# Patient Record
Sex: MALE | Race: WHITE | NOT HISPANIC OR LATINO | Employment: OTHER | URBAN - METROPOLITAN AREA
[De-identification: names, ages, dates, MRNs, and addresses within clinical notes are randomized per-mention and may not be internally consistent; named-entity substitution may affect disease eponyms.]

---

## 2017-05-02 ENCOUNTER — GENERIC CONVERSION - ENCOUNTER (OUTPATIENT)
Dept: OTHER | Facility: OTHER | Age: 72
End: 2017-05-02

## 2017-05-03 LAB — GLUCOSE, PLASMA (HISTORICAL): 155 MG/DL (ref 65–99)

## 2017-05-12 ENCOUNTER — ALLSCRIPTS OFFICE VISIT (OUTPATIENT)
Dept: OTHER | Facility: OTHER | Age: 72
End: 2017-05-12

## 2017-08-01 ENCOUNTER — GENERIC CONVERSION - ENCOUNTER (OUTPATIENT)
Dept: OTHER | Facility: OTHER | Age: 72
End: 2017-08-01

## 2017-08-01 LAB — HBA1C MFR BLD HPLC: 7.1 % (ref 4.8–5.6)

## 2017-08-02 LAB — GLUCOSE, PLASMA (HISTORICAL): 135 MG/DL (ref 65–99)

## 2017-08-11 ENCOUNTER — ALLSCRIPTS OFFICE VISIT (OUTPATIENT)
Dept: OTHER | Facility: OTHER | Age: 72
End: 2017-08-11

## 2017-08-11 DIAGNOSIS — Z12.5 ENCOUNTER FOR SCREENING FOR MALIGNANT NEOPLASM OF PROSTATE: ICD-10-CM

## 2017-08-11 DIAGNOSIS — Z12.11 ENCOUNTER FOR SCREENING FOR MALIGNANT NEOPLASM OF COLON: ICD-10-CM

## 2017-09-01 ENCOUNTER — GENERIC CONVERSION - ENCOUNTER (OUTPATIENT)
Dept: OTHER | Facility: OTHER | Age: 72
End: 2017-09-01

## 2017-11-17 ENCOUNTER — GENERIC CONVERSION - ENCOUNTER (OUTPATIENT)
Dept: OTHER | Facility: OTHER | Age: 72
End: 2017-11-17

## 2017-11-21 LAB
25(OH)D3 SERPL-MCNC: 35.1 NG/ML (ref 30–100)
A/G RATIO (HISTORICAL): 1.7 (ref 1.2–2.2)
ALBUMIN SERPL BCP-MCNC: 4 G/DL (ref 3.5–4.8)
ALP SERPL-CCNC: 70 IU/L (ref 39–117)
ALT SERPL W P-5'-P-CCNC: 24 IU/L (ref 0–44)
AST SERPL W P-5'-P-CCNC: 20 IU/L (ref 0–40)
BASOPHILS # BLD AUTO: 0 %
BASOPHILS # BLD AUTO: 0 X10E3/UL (ref 0–0.2)
BILIRUB SERPL-MCNC: 0.5 MG/DL (ref 0–1.2)
BUN SERPL-MCNC: 25 MG/DL (ref 8–27)
BUN/CREA RATIO (HISTORICAL): 18 (ref 10–24)
CALCIUM SERPL-MCNC: 9.2 MG/DL (ref 8.6–10.2)
CHLORIDE SERPL-SCNC: 101 MMOL/L (ref 96–106)
CHOLEST SERPL-MCNC: 108 MG/DL (ref 100–199)
CHOLEST/HDLC SERPL: 3.2 RATIO UNITS (ref 0–5)
CO2 SERPL-SCNC: 24 MMOL/L (ref 18–29)
CREAT SERPL-MCNC: 1.4 MG/DL (ref 0.76–1.27)
DEPRECATED RDW RBC AUTO: 13.7 % (ref 12.3–15.4)
EGFR AFRICAN AMERICAN (HISTORICAL): 57 ML/MIN/1.73
EGFR-AMERICAN CALC (HISTORICAL): 49 ML/MIN/1.73
EOSINOPHIL # BLD AUTO: 0.2 X10E3/UL (ref 0–0.4)
EOSINOPHIL # BLD AUTO: 2 %
GLUCOSE SERPL-MCNC: 159 MG/DL (ref 65–99)
HBA1C MFR BLD HPLC: 7 % (ref 4.8–5.6)
HCT VFR BLD AUTO: 39.1 % (ref 37.5–51)
HDLC SERPL-MCNC: 34 MG/DL
HGB BLD-MCNC: 13.1 G/DL (ref 12.6–17.7)
IMM.GRANULOCYTES (CD4/8) (HISTORICAL): 0 %
IMM.GRANULOCYTES (CD4/8) (HISTORICAL): 0 X10E3/UL (ref 0–0.1)
LDLC SERPL CALC-MCNC: 52 MG/DL (ref 0–99)
LYMPHOCYTES # BLD AUTO: 1.8 X10E3/UL (ref 0.7–3.1)
LYMPHOCYTES # BLD AUTO: 19 %
MCH RBC QN AUTO: 29.2 PG (ref 26.6–33)
MCHC RBC AUTO-ENTMCNC: 33.5 G/DL (ref 31.5–35.7)
MCV RBC AUTO: 87 FL (ref 79–97)
MONOCYTES # BLD AUTO: 0.8 X10E3/UL (ref 0.1–0.9)
MONOCYTES (HISTORICAL): 8 %
NEUTROPHILS # BLD AUTO: 6.6 X10E3/UL (ref 1.4–7)
NEUTROPHILS # BLD AUTO: 71 %
PLATELET # BLD AUTO: 232 X10E3/UL (ref 150–379)
POTASSIUM SERPL-SCNC: 4.9 MMOL/L (ref 3.5–5.2)
PROSTATE SPECIFIC ANTIGEN (HISTORICAL): 1.5 NG/ML (ref 0–4)
RBC (HISTORICAL): 4.48 X10E6/UL (ref 4.14–5.8)
SODIUM SERPL-SCNC: 140 MMOL/L (ref 134–144)
TOT. GLOBULIN, SERUM (HISTORICAL): 2.3 G/DL (ref 1.5–4.5)
TOTAL PROTEIN (HISTORICAL): 6.3 G/DL (ref 6–8.5)
TRIGL SERPL-MCNC: 111 MG/DL (ref 0–149)
VLDLC SERPL CALC-MCNC: 22 MG/DL (ref 5–40)
WBC # BLD AUTO: 9.5 X10E3/UL (ref 3.4–10.8)

## 2017-11-22 LAB
CREATININE, URINE (HISTORICAL): 141.3 MG/DL
MICROALBUM.,U,RANDOM (HISTORICAL): 16.7 UG/ML
MICROALBUMIN/CREATININE RATIO (HISTORICAL): 11.8 MG/G CREAT (ref 0–30)

## 2017-11-26 ENCOUNTER — GENERIC CONVERSION - ENCOUNTER (OUTPATIENT)
Dept: OTHER | Facility: OTHER | Age: 72
End: 2017-11-26

## 2017-11-29 ENCOUNTER — GENERIC CONVERSION - ENCOUNTER (OUTPATIENT)
Dept: OTHER | Facility: OTHER | Age: 72
End: 2017-11-29

## 2017-12-04 LAB — PLEASE NOTE (HISTORICAL): NORMAL

## 2017-12-05 ENCOUNTER — GENERIC CONVERSION - ENCOUNTER (OUTPATIENT)
Dept: INTERNAL MEDICINE CLINIC | Facility: CLINIC | Age: 72
End: 2017-12-05

## 2017-12-05 LAB — FECAL OCCULT BLOOD DIAGNOSTIC (HISTORICAL): NEGATIVE

## 2017-12-14 ENCOUNTER — GENERIC CONVERSION - ENCOUNTER (OUTPATIENT)
Dept: INTERNAL MEDICINE CLINIC | Facility: CLINIC | Age: 72
End: 2017-12-14

## 2017-12-20 ENCOUNTER — ALLSCRIPTS OFFICE VISIT (OUTPATIENT)
Dept: OTHER | Facility: OTHER | Age: 72
End: 2017-12-20

## 2017-12-21 ENCOUNTER — GENERIC CONVERSION - ENCOUNTER (OUTPATIENT)
Dept: INTERNAL MEDICINE CLINIC | Facility: CLINIC | Age: 72
End: 2017-12-21

## 2017-12-27 ENCOUNTER — GENERIC CONVERSION - ENCOUNTER (OUTPATIENT)
Dept: INTERNAL MEDICINE CLINIC | Facility: CLINIC | Age: 72
End: 2017-12-27

## 2018-01-13 VITALS
HEART RATE: 48 BPM | HEIGHT: 72 IN | OXYGEN SATURATION: 98 % | TEMPERATURE: 95.8 F | BODY MASS INDEX: 31.15 KG/M2 | SYSTOLIC BLOOD PRESSURE: 122 MMHG | DIASTOLIC BLOOD PRESSURE: 70 MMHG | WEIGHT: 230 LBS

## 2018-01-13 NOTE — PROGRESS NOTES
Assessment    1  Arteriosclerotic cardiovascular disease (429 2,440 9) (I25 10)   2  DMII (diabetes mellitus, type 2) (250 00) (E11 9)   3  Hyperlipidemia (272 4) (E78 5)    Plan  Diabetes    · Pioglitazone HCl - 15 MG Oral Tablet (Actos); TAKE 1 TABLET BY MOUTH EVERY  DAY  DMII (diabetes mellitus, type 2)    · Pioglitazone HCl - 15 MG Oral Tablet (Actos); TAKE 1 TABLET BY MOUTH EVERY  DAY   · (1) GLUCOSE,  FASTING; Status:Active; Requested for:63Ksp4589;    · (1) HEMOGLOBIN A1C; Status:Active; Requested for:64Wbh8806;    · *VB - Eye Exam; Status:Active; Requested for:01Dec2016;   DMII (diabetes mellitus, type 2), Vitamin D deficiency    · Onglyza 5 MG Oral Tablet  Hyperlipidemia    · Follow-up visit in 4 Months Evaluation and Treatment  Follow-up  Status: Hold For -  Scheduling  Requested for: 02Dec2016  Need for pneumococcal vaccine    · Prevnar 13 Intramuscular Suspension  Need for prophylactic vaccination and inoculation against influenza    · Fluzone High-Dose 0 5 ML Intramuscular Suspension Prefilled Syringe  Unlinked    · Nitrostat 0 4 MG Sublingual Tablet Sublingual (Nitroglycerin)    Discussion/Summary    #1  Atherosclerotic cardiovascular disease  Patient's cholesterol is under control with present treatment and patient continues to follow-up with his cardiologist on a regular basis  Patient understands importance of her early intervention if symptomatic  Patient is followed very closely by the his wife  #2  Diabetes mellitus type 2  His hemoglobin A1c is 7 0 and patient was told we would like to see better control  Patient was given a new prescription for Actos at 15 mg by mouth daily  He does understand possible side effects of the medication deluding increased swelling of the feet and ankles and he was told if this becomes a problem to please call and stop the medication  #3  Hyperlipidemia  Patient's cholesterol is showing relatively good control and we will continue to monitor this   Patient was told to avoid fats and cholesterol in his diet  Impression: Subsequent Annual Wellness Visit, with preventive exam as well as age and risk appropriate counseling completed  Cardiovascular screening and counseling: the risks and benefits of screening were discussed and screening is current  Diabetes screening and counseling: the risks and benefits of screening were discussed and screening is current  Colorectal cancer screening and counseling: the risks and benefits of screening were discussed and screening is current  Prostate cancer screening and counseling: the risks and benefits of screening were discussed and screening is current  Osteoporosis screening and counseling: the risks and benefits of screening were discussed and the patient declines screening  Abdominal aortic aneurysm screening and counseling: the risks and benefits of screening were discussed and screening is current  Glaucoma screening and counseling: the risks and benefits of screening were discussed and screening is current  HIV screening and counseling: screening not indicated  Immunizations: the risks and benefits of influenza vaccination were discussed with the patient, influenza vaccine is up to date this year, influenza vaccine is due today, the risks and benefits of pneumococcal vaccination were discussed with the patient, the lifetime pneumococcal vaccine has been completed, hepatitis B vaccination series is not indicated at this time due to the patient's low risk of mayelin the disease, the risks and benefits of the Zostavax vaccine were discussed with the patient, the patient declines the Zostavax vaccine, the risks and benefits of the Td vaccine were discussed with the patient and Td vaccination up to date  Advance Directive Planning: not complete, he was encouraged to follow-up with me to discuss his questions and/or decisions   Patient Discussion: plan discussed with the patient, plan discussed with the patient's family, follow-up visit needed in 4 months  Chief Complaint  Patient is here today for an annual wellness visit w/ labs      Advance Directives  Advance Directive [facility]:   NO - Patient does not have an advance health care directive  Capacity/Competence: This patient has full decision making capacity for discussion of advance care planning and This patient has full decision making competency for discussion of advance care planning  Summary of Advance Directive Conversation  We discussed obtaining a living will with both the patient and his wife  Patient will download information and he was told to call if he has any questions or concerns  Patient is unsure of his wishes at this point  History of Present Illness  HPI: Patient is a 29-year-old male with a history of diabetes mellitus type 2, hyperlipidemia, coronary artery disease  Patient is here today for routine physical  Patient did have complete lab test prior to being seen today and we did discuss the results  The major concern is control of his blood sugar which is still marginal with a hemoglobin A1c of 7 0  We discussed changing medication with him especially with the cost and we will be starting him on Actos 15 mg by mouth daily to see if this will help to keep his sugars under better control  Welcome to [employer] and Wellness Visits: The patient is being seen for the subsequent annual wellness visit  Co-Managers and Medical Equipment/Suppliers: See Patient Care Team   Reviewed Updated [address] [place] [address] [address]:   Last Medicare Wellness Visit Information was reviewed, patient interviewed, no change since last AWV  Preventive Quality Program 65 and Older: Falls Risk: The patient fell times in the past 12 months  The patient is currently asymptomatic Symptoms Include:  Associated symptoms:  No associated symptoms are reported  The patient currently has no urinary incontinence symptoms     Date of last glaucoma screen was Patient sees the ophthalmologist on a yearly basis for diabetic retinal examination and glaucoma screening      Review of Systems    Constitutional: negative  Head and Face: negative  Eyes: negative  ENT: negative  Cardiovascular: negative  Respiratory: negative  Gastrointestinal: negative  Genitourinary: negative  Musculoskeletal: negative  Integumentary and Breasts: negative  Neurological: negative  Psychiatric: negative  Endocrine: negative  Hematologic and Lymphatic: negative  Active Problems    1  Allergic rhinitis (477 9) (J30 9)   2  Arteriosclerotic cardiovascular disease (429 2,440 9) (I25 10)   3  Back pain, thoracic (724 1) (M54 6)   4  Coronary artery disease (414 00) (I25 10)   5  Dementia (294 20) (F03 90)   6  Diabetes (250 00) (E11 9)   7  DMII (diabetes mellitus, type 2) (250 00) (E11 9)   8  Encounter for preventive health examination (V70 0) (Z00 00)   9  Hyperlipidemia (272 4) (E78 5)   10  Hypertension (401 9) (I10)   11  Localized, primary osteoarthritis of lower leg, unspecified laterality   12  Lumbago With Sciatica (724 3)   13  MCI (mild cognitive impairment) (331 83) (G31 84)   14  Memory loss or impairment (780 93) (R41 3)   15  Need for prophylactic vaccination and inoculation against influenza (V04 81) (Z23)   16  Obesity (278 00) (E66 9)   17  Vitamin D deficiency (268 9) (E55 9)    Past Medical History    · Need for prophylactic vaccination and inoculation against influenza (V04 81) (Z23)    The active problems and past medical history were reviewed and updated today  Surgical History    The surgical history was reviewed and updated today  Family History  Father    · No pertinent family history    The family history was reviewed and updated today  Social History    · Former smoker (G00 95) (U68 351)   · No alcohol use  The social history was reviewed and updated today  The social history was reviewed and is unchanged  Current Meds   1   Aspirin 81 MG CAPS;   Therapy: (Recorded:61Gvh3226) to Recorded   2  Atorvastatin Calcium 40 MG Oral Tablet; Take 1 tablet daily; Therapy: 20UGR2348 to (Evaluate:58Ght4827)  Requested for: 41LTJ6921; Last   Rx:28Ljq4273 Ordered   3  Daily Multiple Vitamins TABS; Therapy: (Recorded:66Uhj2836) to Recorded   4  MetFORMIN HCl  MG Oral Tablet Extended Release 24 Hour; take 1 tablet twice a   day; Therapy: 11JSJ1562 to (Last Rx:56Eqc4872)  Requested for: 11WWZ9944 Ordered   5  Metoprolol Succinate ER 25 MG Oral Tablet Extended Release 24 Hour; Take 1 tablet   daily; Therapy: 91PKW4721 to (Evaluate:02Zud4516)  Requested for: 97ZSO9101; Last   Rx:22Fue1902 Ordered   6  Nitrostat 0 4 MG Sublingual Tablet Sublingual;   Therapy: 72Shw4964 to (Last Rx:93Itp1732)  Requested for: 24Teh1142 Ordered   7  Onglyza 5 MG Oral Tablet; TAKE ONE TABLET BY MOUTH EVERY DAY; Therapy: 22SHO2125 to (Evaluate:60Bvp4493); Last Rx:19Yey5114 Ordered    The medication list was reviewed and updated today  Allergies    1  No Known Drug Allergies    Immunizations   ** Printed in Appendix #1 below  Vitals  Signs    Temperature: 96 2 F  Heart Rate: 64  Systolic: 120  Diastolic: 72  Height: 6 ft   Weight: 221 lb 2 08 oz  BMI Calculated: 29 99  BSA Calculated: 2 23  O2 Saturation: 99    Physical Exam    Constitutional Pleasant overweight 47-year-old male who is awake alert in no acute distress and oriented x3  Head and Face   Head and face: Normal     Palpation of the face and sinuses: No sinus tenderness  Eyes   Conjunctiva and lids: No erythema, swelling or discharge  Pupils and irises: Equal, round, reactive to light  Ophthalmoscopic examination: Normal fundi and optic discs  Ears, Nose, Mouth, and Throat   External inspection of ears and nose: Normal     Otoscopic examination: Tympanic membranes translucent with normal light reflex  Canals patent without erythema      Hearing: Normal     Nasal mucosa, septum, and turbinates: Normal without edema or erythema  Lips, teeth, and gums: Normal, good dentition  Oropharynx: Normal with no erythema, edema, exudate or lesions  Neck   Neck: Supple, symmetric, trachea midline, no masses  Thyroid: Normal, no thyromegaly  Pulmonary   Respiratory effort: No increased work of breathing or signs of respiratory distress  Percussion of chest: Normal     Palpation of chest: Normal     Auscultation of lungs: Clear to auscultation  Cardiovascular   Palpation of heart: Normal PMI, no thrills  Auscultation of heart: Normal rate and rhythm, normal S1 and S2, no murmurs  Carotid pulses: 2+ bilaterally  Abdominal aorta: Normal     Femoral pulses: 2+ bilaterally  Pedal pulses: 2+ bilaterally  Peripheral vascular exam: Normal     Examination of extremities for edema and/or varicosities: Normal     Chest   Breasts: Normal, no dimpling or skin changes appreciated  Palpation of breasts and axillae: Normal, no masses palpated  Chest: Normal     Abdomen   Abdomen: Non-tender, no masses  Liver and spleen: No hepatomegaly or splenomegaly  Examination for hernias: No hernias appreciated  Anus, perineum, and rectum: Normal sphincter tone, no masses, no prolapse  Awaiting results of Hemoccult, on rectal examination patient did not have any stool use for testing  Genitourinary   Scrotal contents: Normal testes, no masses  Penis: Normal, no lesions  Digital rectal exam of prostate: Normal size, no masses  Lymphatic   Palpation of lymph nodes in neck: No lymphadenopathy  Palpation of lymph nodes in axillae: No lymphadenopathy  Palpation of lymph nodes in groin: No lymphadenopathy  Palpation of lymph nodes in other areas: No lymphadenopathy  Musculoskeletal   Gait and station: Normal     Inspection/palpation of digits and nails: Normal without clubbing or cyanosis      Inspection/palpation of joints, bones, and muscles: Normal     Range of motion: Normal  Stability: Normal     Muscle strength/tone: Normal     Skin   Skin and subcutaneous tissue: Normal without rashes or lesions  Palpation of skin and subcutaneous tissue: Normal turgor  Neurologic   Cranial nerves: Cranial nerves 2-12 intact  Cortical function: Normal mental status  Reflexes: 2+ and symmetric  Sensation: No sensory loss  Coordination: Normal finger to nose and heel to shin  Psychiatric   Judgment and insight: Normal     Orientation to person, place and time: Normal     Recent and remote memory: Intact      Mood and affect: Normal        Future Appointments    Date/Time Provider Specialty Site   2017 01:00 PM Ciro Schmitz DO Internal Medicine [facility] INTERNAL MED     Signatures   Electronically signed by : Jenny Echols DO; Dec  2 2016  5:14PM EST                       (Author)    Appendix #1     Patient: Samantha Anderson ; : 1945; MRN: [id number]      1 2 3 4 5    Influenza  20-Sep-2012 03-Sep-2013 03-Oct-2014 16-Nov-2015 Unknown

## 2018-01-14 VITALS
HEIGHT: 72 IN | BODY MASS INDEX: 30.78 KG/M2 | SYSTOLIC BLOOD PRESSURE: 140 MMHG | OXYGEN SATURATION: 98 % | HEART RATE: 58 BPM | WEIGHT: 227.25 LBS | DIASTOLIC BLOOD PRESSURE: 74 MMHG | TEMPERATURE: 96.3 F

## 2018-01-23 VITALS
OXYGEN SATURATION: 99 % | SYSTOLIC BLOOD PRESSURE: 130 MMHG | DIASTOLIC BLOOD PRESSURE: 62 MMHG | TEMPERATURE: 96.3 F | HEIGHT: 72 IN | WEIGHT: 226 LBS | BODY MASS INDEX: 30.61 KG/M2 | HEART RATE: 52 BPM

## 2018-03-15 DIAGNOSIS — E11.69 TYPE 2 DIABETES MELLITUS WITH OTHER SPECIFIED COMPLICATION, WITHOUT LONG-TERM CURRENT USE OF INSULIN (HCC): Primary | ICD-10-CM

## 2018-03-16 RX ORDER — METFORMIN HYDROCHLORIDE 750 MG/1
TABLET, EXTENDED RELEASE ORAL
Qty: 180 TABLET | Refills: 0 | Status: SHIPPED | OUTPATIENT
Start: 2018-03-16 | End: 2018-07-12 | Stop reason: SDUPTHER

## 2018-03-16 RX ORDER — ATORVASTATIN CALCIUM 40 MG/1
TABLET, FILM COATED ORAL
Qty: 90 TABLET | Refills: 0 | Status: SHIPPED | OUTPATIENT
Start: 2018-03-16 | End: 2018-07-12 | Stop reason: SDUPTHER

## 2018-05-08 LAB
BUN SERPL-MCNC: 21 MG/DL (ref 8–27)
BUN/CREAT SERPL: 18 (ref 10–24)
CALCIUM SERPL-MCNC: 9 MG/DL (ref 8.6–10.2)
CHLORIDE SERPL-SCNC: 103 MMOL/L (ref 96–106)
CO2 SERPL-SCNC: 22 MMOL/L (ref 18–29)
CREAT SERPL-MCNC: 1.18 MG/DL (ref 0.76–1.27)
GLUCOSE SERPL-MCNC: 198 MG/DL (ref 65–99)
HBA1C MFR BLD: 7.1 % (ref 4.8–5.6)
POTASSIUM SERPL-SCNC: 5.1 MMOL/L (ref 3.5–5.2)
SL AMB EGFR AFRICAN AMERICAN: 70 ML/MIN/1.73
SL AMB EGFR NON AFRICAN AMERICAN: 60 ML/MIN/1.73
SODIUM SERPL-SCNC: 138 MMOL/L (ref 134–144)

## 2018-05-18 ENCOUNTER — OFFICE VISIT (OUTPATIENT)
Dept: INTERNAL MEDICINE CLINIC | Facility: CLINIC | Age: 73
End: 2018-05-18
Payer: COMMERCIAL

## 2018-05-18 VITALS
TEMPERATURE: 96.3 F | WEIGHT: 219 LBS | DIASTOLIC BLOOD PRESSURE: 70 MMHG | BODY MASS INDEX: 29.66 KG/M2 | SYSTOLIC BLOOD PRESSURE: 130 MMHG | HEART RATE: 54 BPM | OXYGEN SATURATION: 98 % | HEIGHT: 72 IN

## 2018-05-18 DIAGNOSIS — I25.10 ARTERIOSCLEROTIC CARDIOVASCULAR DISEASE: ICD-10-CM

## 2018-05-18 DIAGNOSIS — E11.9 TYPE 2 DIABETES MELLITUS WITHOUT COMPLICATION, WITHOUT LONG-TERM CURRENT USE OF INSULIN (HCC): Primary | ICD-10-CM

## 2018-05-18 DIAGNOSIS — E78.01 FAMILIAL HYPERCHOLESTEROLEMIA: ICD-10-CM

## 2018-05-18 DIAGNOSIS — I48.0 PAROXYSMAL ATRIAL FIBRILLATION (HCC): ICD-10-CM

## 2018-05-18 DIAGNOSIS — I10 ESSENTIAL HYPERTENSION: ICD-10-CM

## 2018-05-18 PROBLEM — I48.91 ATRIAL FIBRILLATION (HCC): Status: ACTIVE | Noted: 2017-12-20

## 2018-05-18 PROCEDURE — 99214 OFFICE O/P EST MOD 30 MIN: CPT | Performed by: INTERNAL MEDICINE

## 2018-05-18 RX ORDER — APIXABAN 5 MG/1
5 TABLET, FILM COATED ORAL 2 TIMES DAILY
COMMUNITY
Start: 2018-04-02

## 2018-05-18 RX ORDER — SOTALOL HYDROCHLORIDE 80 MG/1
40 TABLET ORAL 2 TIMES DAILY
COMMUNITY
Start: 2018-03-17

## 2018-05-18 RX ORDER — MULTIVITAMIN
TABLET ORAL
COMMUNITY

## 2018-05-18 NOTE — ASSESSMENT & PLAN NOTE
Hyperlipidemia  Patient is on a high dose of atorvastatin for his hyperlipidemia  Again the patient was told that medication is important but also as important is watching his diet and intake of fats and cholesterol

## 2018-05-18 NOTE — ASSESSMENT & PLAN NOTE
Hypertension  Patient does have a history of hypertension and he remains on present treatment specifically the Betapace to help with his blood pressure and control his heart rate  Patient's blood pressure control is acceptable this point time

## 2018-05-18 NOTE — PROGRESS NOTES
Diabetic Foot Exam    Patient's shoes and socks removed  Right Foot/Ankle   Right Foot Inspection  Skin Exam: skin normal and skin intact no dry skin, no warmth, no callus, no erythema, no maceration, no abnormal color, no pre-ulcer, no ulcer and no callus                          Toe Exam: ROM and strength within normal limits and right toe deformityno swelling, no tenderness and erythema  Sensory   Vibration: diminished  Proprioception: diminished   Monofilament testing: diminished  Vascular  Capillary refills: < 3 seconds  The right DP pulse is 1+  The right PT pulse is 1+  Left Foot/Ankle  Left Foot Inspection  Skin Exam: skin normal and skin intactno dry skin, no warmth, no erythema, no maceration, normal color, no pre-ulcer, no ulcer and no callus                         Toe Exam: ROM and strength within normal limits and left toe deformity (Flat-footed)no swelling, no tenderness and no erythema                   Sensory   Vibration: diminished  Proprioception: diminished  Monofilament: diminished  Vascular  Capillary refills: < 3 seconds  The left DP pulse is 1+  The left PT pulse is 1+  Assign Risk Category:  Deformity present;  No loss of protective sensation; Weak pulses       Risk: 1

## 2018-05-18 NOTE — ASSESSMENT & PLAN NOTE
Atrial fibrillation, atrial flutter  Patient has undergone procedure, ablation and fluid all fields and he states this heart rate is controlled  Patient [facility] a [facility] and decisions made about further treatment and/or evaluation and whether not it would be safe to for the patient to stop his Eliquis

## 2018-05-18 NOTE — PROGRESS NOTES
Assessment/Plan:    DMII (diabetes mellitus, type 2) (HCC)  Diabetes mellitus type 2  Patient is here today for re-evaluation  He did have a blood sugar and hemoglobin A1c performed prior to the visit today and we did discuss the results  His hemoglobin A1c is 7 1 showing an perfect control  A major contributing factor as far as this is concerned as the fact patient is not watching his diet whatsoever  We did reinforce to the patient the importance of working on his diet, reduce his intake of concentrated sweets and simple carbohydrates and increase his exercise  Patient is accompanied by his wife who understands and agrees to motivate the patient for this  We will check a fasting blood sugar, hemoglobin A1c with his next visit in 4 months    Hypertension  Hypertension  Patient does have a history of hypertension and he remains on present treatment specifically the Betapace to help with his blood pressure and control his heart rate  Patient's blood pressure control is acceptable this point time  Atrial fibrillation (HCC)  Atrial fibrillation, atrial flutter  Patient has undergone procedure, ablation and fluid all fields and he states this heart rate is controlled  Patient does have a appointment next week to be seen by cardiologist [place] and decisions made about further treatment and/or evaluation and whether not it would be safe to for the patient to stop his Eliquis  Arteriosclerotic cardiovascular disease  Atherosclerotic cardiovascular disease  Patient is now under the care of cardiologists in [place] we are checking for his cardiac function and any signs of recurrent ischemia  Again it is extremely important that the patient watches diet to reduce his intake of fats and cholesterol and also work on better blood sugar control  Hyperlipidemia  Hyperlipidemia  Patient is on a high dose of atorvastatin for his hyperlipidemia    Again the patient was told that medication is important but also as important is watching his diet and intake of fats and cholesterol  Diagnoses and all orders for this visit:    Type 2 diabetes mellitus without complication, without long-term current use of insulin (HCC)  -     Basic metabolic panel; Future  -     HEMOGLOBIN A1C W/ EAG ESTIMATION; Future    Arteriosclerotic cardiovascular disease    Paroxysmal atrial fibrillation (HCC)    Essential hypertension    Familial hypercholesterolemia    Other orders  -     sotalol (BETAPACE) 80 mg tablet;   -     ELIQUIS 5 MG;   -     aspirin 81 MG tablet; Take by mouth  -     Calcium Carb-Cholecalciferol (CALCIUM CARBONATE-VITAMIN D3 PO); Take by mouth  -     DAILY MULTIPLE VITAMINS tablet; Take by mouth  -     saxagliptin (ONGLYZA) 5 MG tablet; Take by mouth          Subjective:      Patient ID: Cecelia Liang is a 74 y o  male  Patient is a 75-year-old male with a history of multiple medical problems who is here today for routine follow-up  Patient states in general he is feeling well and he has no new complaints or problems  The following portions of the patient's history were reviewed and updated as appropriate:   He  has no past medical history on file  He   Patient Active Problem List    Diagnosis Date Noted    Atrial fibrillation ([facility] ) 12/20/2017    Hypertension 09/19/2014    Arteriosclerotic cardiovascular disease 09/20/2012    DMII (diabetes mellitus, type 2) ([facility] ) 09/20/2012    Hyperlipidemia 09/20/2012     He  has a past surgical history that includes No past surgeries  His family history includes No Known Problems in his brother, father, maternal grandfather, maternal grandmother, mother, paternal grandfather, paternal grandmother, and sister  He  reports that he has quit smoking  He does not have any smokeless tobacco history on file  He reports that he does not drink alcohol  His drug history is not on file    Current Outpatient Prescriptions   Medication Sig Dispense Refill    aspirin 81 MG tablet Take by mouth      atorvastatin (LIPITOR) 40 mg tablet TAKE 1 TABLET BY MOUTH DAILY 90 tablet 0    Calcium Carb-Cholecalciferol (CALCIUM CARBONATE-VITAMIN D3 PO) Take by mouth      DAILY MULTIPLE VITAMINS tablet Take by mouth      ELIQUIS 5 MG       metFORMIN (GLUCOPHAGE-XR) 750 mg 24 hr tablet TAKE 1 TABLET BY MOUTH TWICE DAILY 180 tablet 0    saxagliptin (ONGLYZA) 5 MG tablet Take by mouth      sotalol (BETAPACE) 80 mg tablet        No current facility-administered medications for this visit  Current Outpatient Prescriptions on File Prior to Visit   Medication Sig    atorvastatin (LIPITOR) 40 mg tablet TAKE 1 TABLET BY MOUTH DAILY    metFORMIN (GLUCOPHAGE-XR) 750 mg 24 hr tablet TAKE 1 TABLET BY MOUTH TWICE DAILY     No current facility-administered medications on file prior to visit  He has No Known Allergies       Review of Systems   Constitutional: Negative  HENT: Negative  Eyes: Negative  Respiratory: Negative  Cardiovascular: Negative  Gastrointestinal: Negative  Endocrine: Negative  Genitourinary: Negative  Musculoskeletal: Negative  Skin: Negative  Allergic/Immunologic: Negative  Hematological: Negative  Psychiatric/Behavioral: Negative  Objective:      /70   Pulse (!) 54   Temp (!) 96 3 °F (35 7 °C)   Ht 6' (1 829 m)   Wt 99 3 kg (219 lb)   SpO2 98%   BMI 29 70 kg/m²          Physical Exam   Constitutional: He is oriented to person, place, and time  He appears well-developed and well-nourished  No distress  Pleasant, overweight, 78-year-old male who is awake alert no acute distress   HENT:   Head: Normocephalic and atraumatic  Right Ear: External ear normal    Left Ear: External ear normal    Nose: Nose normal    Mouth/Throat: Oropharynx is clear and moist  No oropharyngeal exudate  Eyes: Conjunctivae and EOM are normal  Right eye exhibits no discharge  Left eye exhibits no discharge  No scleral icterus     Neck: Neck supple  No JVD present  No tracheal deviation present  No thyromegaly present  Cardiovascular: Normal rate, regular rhythm and intact distal pulses  Exam reveals no gallop and no friction rub  No murmur heard  Pulmonary/Chest: Effort normal and breath sounds normal  No stridor  No respiratory distress  He has no wheezes  He has no rales  He exhibits no tenderness  Abdominal: Soft  Bowel sounds are normal  He exhibits no distension and no mass  There is no tenderness  There is no rebound and no guarding  Musculoskeletal: Normal range of motion  He exhibits deformity  He exhibits no edema or tenderness  Patient has some arthritic deformities to the left arm secondary to previous accidental injury, patient has flat feet and does not need and this is apparent with his gait especially which is family feels is unstable  Has no falls   Neurological: He is alert and oriented to person, place, and time  He has normal reflexes  He displays normal reflexes  No cranial nerve deficit  He exhibits normal muscle tone  Coordination normal    Skin: Skin is warm and dry  No rash noted  He is not diaphoretic  No erythema  Psychiatric: He has a normal mood and affect  His behavior is normal  Judgment and thought content normal    Nursing note and vitals reviewed

## 2018-05-18 NOTE — ASSESSMENT & PLAN NOTE
Atherosclerotic cardiovascular disease  Patient is now under the care of cardiologists in [place] we are checking for his cardiac function and any signs of recurrent ischemia  Again it is extremely important that the patient watches diet to reduce his intake of fats and cholesterol and also work on better blood sugar control

## 2018-06-23 DIAGNOSIS — E11.69 TYPE 2 DIABETES MELLITUS WITH OTHER SPECIFIED COMPLICATION, WITH LONG-TERM CURRENT USE OF INSULIN (HCC): Primary | ICD-10-CM

## 2018-06-23 DIAGNOSIS — Z79.4 TYPE 2 DIABETES MELLITUS WITH OTHER SPECIFIED COMPLICATION, WITH LONG-TERM CURRENT USE OF INSULIN (HCC): Primary | ICD-10-CM

## 2018-06-25 RX ORDER — SAXAGLIPTIN 5 MG/1
TABLET, FILM COATED ORAL
Qty: 90 TABLET | Refills: 0 | Status: SHIPPED | OUTPATIENT
Start: 2018-06-25 | End: 2018-09-20 | Stop reason: SDUPTHER

## 2018-07-12 DIAGNOSIS — E11.69 TYPE 2 DIABETES MELLITUS WITH OTHER SPECIFIED COMPLICATION, WITHOUT LONG-TERM CURRENT USE OF INSULIN (HCC): ICD-10-CM

## 2018-07-12 RX ORDER — METFORMIN HYDROCHLORIDE 750 MG/1
750 TABLET, EXTENDED RELEASE ORAL 2 TIMES DAILY
Qty: 180 TABLET | Refills: 3 | Status: SHIPPED | OUTPATIENT
Start: 2018-07-12 | End: 2018-09-20 | Stop reason: SDUPTHER

## 2018-07-12 RX ORDER — ATORVASTATIN CALCIUM 40 MG/1
40 TABLET, FILM COATED ORAL DAILY
Qty: 90 TABLET | Refills: 0 | Status: SHIPPED | OUTPATIENT
Start: 2018-07-12 | End: 2018-09-20 | Stop reason: SDUPTHER

## 2018-07-26 LAB
LEFT EYE DIABETIC RETINOPATHY: NORMAL
RIGHT EYE DIABETIC RETINOPATHY: NORMAL

## 2018-07-26 PROCEDURE — 3072F LOW RISK FOR RETINOPATHY: CPT | Performed by: INTERNAL MEDICINE

## 2018-09-12 LAB
AMBIG ABBREV DEFAULT: NORMAL
BUN SERPL-MCNC: 23 MG/DL (ref 8–27)
BUN/CREAT SERPL: 19 (ref 10–24)
CALCIUM SERPL-MCNC: 9.2 MG/DL (ref 8.6–10.2)
CHLORIDE SERPL-SCNC: 100 MMOL/L (ref 96–106)
CO2 SERPL-SCNC: 23 MMOL/L (ref 20–29)
CREAT SERPL-MCNC: 1.23 MG/DL (ref 0.76–1.27)
EST. AVERAGE GLUCOSE BLD GHB EST-MCNC: 166 MG/DL
GLUCOSE SERPL-MCNC: 191 MG/DL (ref 65–99)
HBA1C MFR BLD: 7.4 % (ref 4.8–5.6)
POTASSIUM SERPL-SCNC: 4.8 MMOL/L (ref 3.5–5.2)
SL AMB EGFR AFRICAN AMERICAN: 66 ML/MIN/1.73
SL AMB EGFR NON AFRICAN AMERICAN: 57 ML/MIN/1.73
SODIUM SERPL-SCNC: 139 MMOL/L (ref 134–144)

## 2018-09-20 ENCOUNTER — OFFICE VISIT (OUTPATIENT)
Dept: INTERNAL MEDICINE CLINIC | Facility: CLINIC | Age: 73
End: 2018-09-20
Payer: COMMERCIAL

## 2018-09-20 VITALS
SYSTOLIC BLOOD PRESSURE: 118 MMHG | BODY MASS INDEX: 30.34 KG/M2 | OXYGEN SATURATION: 98 % | HEART RATE: 48 BPM | TEMPERATURE: 96 F | WEIGHT: 224 LBS | HEIGHT: 72 IN | DIASTOLIC BLOOD PRESSURE: 62 MMHG

## 2018-09-20 DIAGNOSIS — E11.9 TYPE 2 DIABETES MELLITUS WITHOUT COMPLICATION, WITHOUT LONG-TERM CURRENT USE OF INSULIN (HCC): ICD-10-CM

## 2018-09-20 DIAGNOSIS — E11.69 TYPE 2 DIABETES MELLITUS WITH OTHER SPECIFIED COMPLICATION, WITHOUT LONG-TERM CURRENT USE OF INSULIN (HCC): ICD-10-CM

## 2018-09-20 DIAGNOSIS — I10 ESSENTIAL HYPERTENSION: ICD-10-CM

## 2018-09-20 DIAGNOSIS — Z23 NEED FOR INFLUENZA VACCINATION: ICD-10-CM

## 2018-09-20 DIAGNOSIS — I25.10 ARTERIOSCLEROTIC CARDIOVASCULAR DISEASE: ICD-10-CM

## 2018-09-20 DIAGNOSIS — E78.01 FAMILIAL HYPERCHOLESTEROLEMIA: ICD-10-CM

## 2018-09-20 DIAGNOSIS — E11.69 TYPE 2 DIABETES MELLITUS WITH OTHER SPECIFIED COMPLICATION, WITH LONG-TERM CURRENT USE OF INSULIN (HCC): ICD-10-CM

## 2018-09-20 DIAGNOSIS — Z79.4 TYPE 2 DIABETES MELLITUS WITH OTHER SPECIFIED COMPLICATION, WITH LONG-TERM CURRENT USE OF INSULIN (HCC): ICD-10-CM

## 2018-09-20 DIAGNOSIS — Z12.11 COLON CANCER SCREENING: Primary | ICD-10-CM

## 2018-09-20 DIAGNOSIS — I48.0 PAROXYSMAL ATRIAL FIBRILLATION (HCC): ICD-10-CM

## 2018-09-20 PROCEDURE — 3725F SCREEN DEPRESSION PERFORMED: CPT | Performed by: INTERNAL MEDICINE

## 2018-09-20 PROCEDURE — 3074F SYST BP LT 130 MM HG: CPT | Performed by: INTERNAL MEDICINE

## 2018-09-20 PROCEDURE — 1160F RVW MEDS BY RX/DR IN RCRD: CPT

## 2018-09-20 PROCEDURE — 99214 OFFICE O/P EST MOD 30 MIN: CPT | Performed by: INTERNAL MEDICINE

## 2018-09-20 PROCEDURE — 3078F DIAST BP <80 MM HG: CPT | Performed by: INTERNAL MEDICINE

## 2018-09-20 PROCEDURE — 1160F RVW MEDS BY RX/DR IN RCRD: CPT | Performed by: INTERNAL MEDICINE

## 2018-09-20 PROCEDURE — 3008F BODY MASS INDEX DOCD: CPT | Performed by: INTERNAL MEDICINE

## 2018-09-20 PROCEDURE — 1101F PT FALLS ASSESS-DOCD LE1/YR: CPT | Performed by: INTERNAL MEDICINE

## 2018-09-20 PROCEDURE — G0008 ADMIN INFLUENZA VIRUS VAC: HCPCS

## 2018-09-20 PROCEDURE — 90662 IIV NO PRSV INCREASED AG IM: CPT

## 2018-09-20 PROCEDURE — 1036F TOBACCO NON-USER: CPT | Performed by: INTERNAL MEDICINE

## 2018-09-20 RX ORDER — ATORVASTATIN CALCIUM 40 MG/1
40 TABLET, FILM COATED ORAL DAILY
Qty: 90 TABLET | Refills: 0 | Status: SHIPPED | OUTPATIENT
Start: 2018-09-20 | End: 2021-06-21 | Stop reason: DRUGHIGH

## 2018-09-20 RX ORDER — METFORMIN HYDROCHLORIDE 750 MG/1
750 TABLET, EXTENDED RELEASE ORAL 2 TIMES DAILY
Qty: 180 TABLET | Refills: 0 | Status: SHIPPED | OUTPATIENT
Start: 2018-09-20 | End: 2019-09-12 | Stop reason: SDUPTHER

## 2018-09-20 NOTE — PROGRESS NOTES
Diabetic Foot Exam    Patient's shoes and socks removed  Right Foot/Ankle   Right Foot Inspection  Skin Exam: skin normal and skin intact no dry skin, no warmth, no callus, no erythema, no maceration, no abnormal color, no pre-ulcer, no ulcer and no callus                          Toe Exam: ROM and strength within normal limits  Sensory   Vibration: diminished  Proprioception: diminished   Monofilament testing: diminished  Vascular  Capillary refills: < 3 seconds  The right DP pulse is 2+  The right PT pulse is 2+  Left Foot/Ankle  Left Foot Inspection  Skin Exam: skin normal and skin intactno dry skin, no warmth, no erythema, no maceration, normal color, no pre-ulcer, no ulcer and no callus                         Toe Exam: ROM and strength within normal limits                   Sensory     Proprioception: intact  Monofilament: intact  Vascular  Capillary refills: < 3 seconds  The left DP pulse is 2+  The left PT pulse is 2+  Assign Risk Category:  No deformity present; Loss of protective sensation;  No weak pulses       Risk: 1

## 2018-09-20 NOTE — ASSESSMENT & PLAN NOTE
Lab Results   Component Value Date    HGBA1C 7 4 (H) 09/11/2018       No results for input(s): POCGLU in the last 72 hours  Blood Sugar Average: Last 72 hrs:   patient has a history of variable control of his blood sugars  His wife who is in attendance states the patient is not watching his diet  He his intake is inappropriate and he is not getting any regular exercise  Patient admits to this and we know that in the past when his work harder on his diet and exercise program he has shown better control of his blood sugars  Patient in knows to look closely at his diet intake and try to improve his exercise program   We did discuss again the importance of avoiding concentrated sweets and simple carbohydrates and to work at weight loss  We will check a fasting blood sugar, hemoglobin A1c microalbumin with his next visit    We did discuss with the patient the importance of sugar control especially in light of the fact of having coronary artery disease and increased risk of further problems with heart disease secondary to diabetes

## 2018-09-20 NOTE — ASSESSMENT & PLAN NOTE
Patient has a history of being noncompliant with his diet as noted above  He remains on atorvastatin 40 mg daily    Patient is to have a lipid profile performed in the near future with his cardiologist   Again I stressed the importance of diet and exercise, weight loss with the patient

## 2018-09-20 NOTE — PROGRESS NOTES
Assessment/Plan:    DMII (diabetes mellitus, type 2) ([facility])  Lab Results   Component Value Date    HGBA1C 7 4 (H) 09/11/2018       No results for input(s): POCGLU in the last 72 hours  Blood Sugar Average: Last 72 hrs:   patient has a history of variable control of his blood sugars  His wife who is in attendance states the patient is not watching his diet  He his intake is inappropriate and he is not getting any regular exercise  Patient admits to this and we know that in the past when his work harder on his diet and exercise program he has shown better control of his blood sugars  Patient in knows to look closely at his diet intake and try to improve his exercise program   We did discuss again the importance of avoiding concentrated sweets and simple carbohydrates and to work at weight loss  We will check a fasting blood sugar, hemoglobin A1c microalbumin with his next visit  We did discuss with the patient the importance of sugar control especially in light of the fact of having coronary artery disease and increased risk of further problems with heart disease secondary to diabetes    Arteriosclerotic cardiovascular disease  Atherosclerotic cardiovascular disease  Patient is to undergo a full workup and evaluation in approximately 1 month with his cardiologist including echocardiogram, stress test   Patient also has a slip for extensive lab testing to have done for the cardiologist prior to his next visit with them  His wife states that she will receive copies of the results blood testing and bring this along with her when they return to the office in 4 months for a physical   Lab tests that were not performed by the cardiologist will be accomplished through us prior to his next visit  Atrial fibrillation ([address] )  Home patient's heart rate is controlled and on exam today his heart rate was regular  Again patient has an upcoming visit to be seen by Cardiology  He remains on Eliquis as an anticoagulant  He has had no abnormal bleeding    Hypertension  Patient's blood pressure is controlled and we will continue present surveillance  We will also be checking on patient's renal function with his next visit  Diagnoses and all orders for this visit:    Colon cancer screening  -     Occult Blood, Fecal Immunochemical; Future    Need for influenza vaccination  -     influenza vaccine, [id number], high-dose, PF 0 5 mL, for patients 72 yr+ (FLUZONE HIGH-DOSE)    Type 2 diabetes mellitus without complication, without long-term current use of insulin ([address] )  -     Basic metabolic panel; Future  -     Microalbumin / creatinine urine ratio  -     Hemoglobin A1C; Future  -     Microalbumin / creatinine urine ratio  -     sitaGLIPtin (JANUVIA) 100 mg tablet; Take 1 tablet (100 mg total) by mouth daily    Arteriosclerotic cardiovascular disease    Paroxysmal atrial fibrillation (HCC)    Essential hypertension  -     Basic metabolic panel; Future    Familial hypercholesterolemia    Type 2 diabetes mellitus with other specified complication, with long-term current use of insulin (HCC)  -     Discontinue: sitaGLIPtin (JANUVIA) 100 mg tablet; Take 1 tablet (100 mg total) by mouth daily    Type 2 diabetes mellitus with other specified complication, without long-term current use of insulin (HCC)  -     atorvastatin (LIPITOR) 40 mg tablet; Take 1 tablet (40 mg total) by mouth daily  -     metFORMIN (GLUCOPHAGE-XR) 750 mg 24 hr tablet; Take 1 tablet (750 mg total) by mouth 2 (two) times a day          Subjective:      Patient ID: Emily Maynard is a 74 y o  male  Patient is 26-year-old male with a history of hypertension, coronary artery disease, atrial fibrillation, diabetes mellitus type 2, hyperlipidemia  Patient is here today for routine follow-up    Patient did have labs performed prior to the visit today we did discuss the results specifically looking at his blood sugar which continues to show some poor control secondary to dietary indiscretions and lack of exercise        The following portions of the patient's history were reviewed and updated as appropriate:   He  has no past medical history on file  He   Patient Active Problem List    Diagnosis Date Noted    Atrial fibrillation ([facility] ) 12/20/2017    Hypertension 09/19/2014    Arteriosclerotic cardiovascular disease 09/20/2012    DMII (diabetes mellitus, type 2) ([facility] ) 09/20/2012    Hyperlipidemia 09/20/2012     He  has a past surgical history that includes No past surgeries  His family history includes No Known Problems in his brother, father, maternal grandfather, maternal grandmother, mother, paternal grandfather, paternal grandmother, and sister  He  reports that he has quit smoking  He has never used smokeless tobacco  He reports that he does not drink alcohol  His drug history is not on file  Current Outpatient Prescriptions   Medication Sig Dispense Refill    aspirin 81 MG tablet Take by mouth      atorvastatin (LIPITOR) 40 mg tablet Take 1 tablet (40 mg total) by mouth daily 90 tablet 0    Calcium Carb-Cholecalciferol (CALCIUM CARBONATE-VITAMIN D3 PO) Take by mouth      DAILY MULTIPLE VITAMINS tablet Take by mouth      ELIQUIS 5 MG       metFORMIN (GLUCOPHAGE-XR) 750 mg 24 hr tablet Take 1 tablet (750 mg total) by mouth 2 (two) times a day 180 tablet 0    sotalol (BETAPACE) 80 mg tablet       sitaGLIPtin (JANUVIA) 100 mg tablet Take 1 tablet (100 mg total) by mouth daily 90 tablet 3     No current facility-administered medications for this visit        Current Outpatient Prescriptions on File Prior to Visit   Medication Sig    aspirin 81 MG tablet Take by mouth    Calcium Carb-Cholecalciferol (CALCIUM CARBONATE-VITAMIN D3 PO) Take by mouth    DAILY MULTIPLE VITAMINS tablet Take by mouth    ELIQUIS 5 MG     sotalol (BETAPACE) 80 mg tablet     [DISCONTINUED] atorvastatin (LIPITOR) 40 mg tablet Take 1 tablet (40 mg total) by mouth daily    [DISCONTINUED] metFORMIN (GLUCOPHAGE-XR) 750 mg 24 hr tablet Take 1 tablet (750 mg total) by mouth 2 (two) times a day    [DISCONTINUED] ONGLYZA 5 MG tablet TAKE 1 TABLET EVERY DAY     No current facility-administered medications on file prior to visit  He has No Known Allergies       Review of Systems   Constitutional: Negative  HENT: Negative  Eyes: Negative  Respiratory: Negative  Cardiovascular: Negative  Gastrointestinal: Negative  Endocrine: Negative  Genitourinary: Negative  Musculoskeletal: Positive for arthralgias (Some occasional arthritic aches and pains but nothing new or disabling)  Negative for back pain, gait problem, joint swelling, myalgias and neck pain  Skin: Negative  Allergic/Immunologic: Negative  Neurological: Negative  Hematological: Negative  Psychiatric/Behavioral: Negative  Objective:      /62   Pulse (!) 48   Temp (!) 96 °F (35 6 °C)   Ht 6' (1 829 m)   Wt 102 kg (224 lb)   SpO2 98%   BMI 30 38 kg/m²          Physical Exam   Constitutional: He is oriented to person, place, and time  He appears well-developed and well-nourished  No distress  Very pleasant, overweight 56-year-old male who is awake alert no acute distress and oriented x3   HENT:   Head: Normocephalic and atraumatic  Right Ear: External ear normal    Left Ear: External ear normal    Nose: Nose normal    Mouth/Throat: No oropharyngeal exudate  Eyes: Conjunctivae and EOM are normal  Pupils are equal, round, and reactive to light  Right eye exhibits no discharge  Left eye exhibits no discharge  No scleral icterus  Neck: Normal range of motion  Neck supple  No JVD present  No tracheal deviation present  No thyromegaly present  Cardiovascular: Normal rate, regular rhythm, normal heart sounds and intact distal pulses  Exam reveals no gallop and no friction rub  No murmur heard  Pulmonary/Chest: Effort normal and breath sounds normal  No stridor   No respiratory distress  He has no wheezes  He has no rales  He exhibits no tenderness  Abdominal: Soft  Bowel sounds are normal  He exhibits no distension and no mass  There is no tenderness  There is no rebound and no guarding  Musculoskeletal: Normal range of motion  He exhibits deformity (Deformity noted to his right upper arm, elbow from previous accidental injury)  He exhibits no edema or tenderness  Lymphadenopathy:     He has no cervical adenopathy  Neurological: He is alert and oriented to person, place, and time  He has normal reflexes  He displays normal reflexes  No cranial nerve deficit  He exhibits normal muscle tone  Coordination normal    Skin: Skin is warm and dry  No rash noted  He is not diaphoretic  No erythema  No pallor  Psychiatric: He has a normal mood and affect  His behavior is normal  Judgment and thought content normal    Nursing note and vitals reviewed

## 2018-09-20 NOTE — ASSESSMENT & PLAN NOTE
Home patient's heart rate is controlled and on exam today his heart rate was regular  Again patient has an upcoming visit to be seen by Cardiology  He remains on Eliquis as an anticoagulant    He has had no abnormal bleeding

## 2018-09-20 NOTE — ASSESSMENT & PLAN NOTE
Atherosclerotic cardiovascular disease  Patient is to undergo a full workup and evaluation in approximately 1 month with his cardiologist including echocardiogram, stress test   Patient also has a slip for extensive lab testing to have done for the cardiologist prior to his next visit with them  His wife states that she will receive copies of the results blood testing and bring this along with her when they return to the office in 4 months for a physical   Lab tests that were not performed by the cardiologist will be accomplished through us prior to his next visit

## 2018-09-20 NOTE — ASSESSMENT & PLAN NOTE
Patient's blood pressure is controlled and we will continue present surveillance  We will also be checking on patient's renal function with his next visit

## 2018-09-20 NOTE — TELEPHONE ENCOUNTER
This Rx was supposed to be sent over to [facility] [place]

## 2018-12-07 LAB
BUN SERPL-MCNC: 22 MG/DL (ref 8–27)
BUN/CREAT SERPL: 15 (ref 10–24)
CALCIUM SERPL-MCNC: 9.8 MG/DL (ref 8.6–10.2)
CHLORIDE SERPL-SCNC: 99 MMOL/L (ref 96–106)
CO2 SERPL-SCNC: 24 MMOL/L (ref 20–29)
CREAT SERPL-MCNC: 1.48 MG/DL (ref 0.76–1.27)
GLUCOSE SERPL-MCNC: 208 MG/DL (ref 65–99)
HBA1C MFR BLD: 8.7 % (ref 4.8–5.6)
LABCORP COMMENT: NORMAL
POTASSIUM SERPL-SCNC: 5.1 MMOL/L (ref 3.5–5.2)
SL AMB EGFR AFRICAN AMERICAN: 53 ML/MIN/1.73
SL AMB EGFR NON AFRICAN AMERICAN: 46 ML/MIN/1.73
SODIUM SERPL-SCNC: 138 MMOL/L (ref 134–144)

## 2018-12-13 ENCOUNTER — OFFICE VISIT (OUTPATIENT)
Dept: INTERNAL MEDICINE CLINIC | Facility: CLINIC | Age: 73
End: 2018-12-13
Payer: COMMERCIAL

## 2018-12-13 VITALS
OXYGEN SATURATION: 98 % | HEIGHT: 72 IN | SYSTOLIC BLOOD PRESSURE: 144 MMHG | WEIGHT: 214 LBS | BODY MASS INDEX: 28.99 KG/M2 | HEART RATE: 71 BPM | TEMPERATURE: 96.2 F | DIASTOLIC BLOOD PRESSURE: 72 MMHG

## 2018-12-13 DIAGNOSIS — I10 ESSENTIAL HYPERTENSION: ICD-10-CM

## 2018-12-13 DIAGNOSIS — E78.01 FAMILIAL HYPERCHOLESTEROLEMIA: ICD-10-CM

## 2018-12-13 DIAGNOSIS — I25.10 ARTERIOSCLEROTIC CARDIOVASCULAR DISEASE: ICD-10-CM

## 2018-12-13 DIAGNOSIS — I48.0 PAROXYSMAL ATRIAL FIBRILLATION (HCC): ICD-10-CM

## 2018-12-13 DIAGNOSIS — E11.9 TYPE 2 DIABETES MELLITUS WITHOUT COMPLICATION, WITHOUT LONG-TERM CURRENT USE OF INSULIN (HCC): Primary | ICD-10-CM

## 2018-12-13 PROBLEM — N30.00 ACUTE CYSTITIS WITHOUT HEMATURIA: Status: ACTIVE | Noted: 2018-12-13

## 2018-12-13 PROCEDURE — 4040F PNEUMOC VAC/ADMIN/RCVD: CPT | Performed by: INTERNAL MEDICINE

## 2018-12-13 PROCEDURE — 99214 OFFICE O/P EST MOD 30 MIN: CPT | Performed by: INTERNAL MEDICINE

## 2018-12-13 PROCEDURE — 3008F BODY MASS INDEX DOCD: CPT | Performed by: INTERNAL MEDICINE

## 2018-12-13 PROCEDURE — 1036F TOBACCO NON-USER: CPT | Performed by: INTERNAL MEDICINE

## 2018-12-13 PROCEDURE — 1160F RVW MEDS BY RX/DR IN RCRD: CPT | Performed by: INTERNAL MEDICINE

## 2018-12-13 RX ORDER — TAMSULOSIN HYDROCHLORIDE 0.4 MG/1
CAPSULE ORAL
Refills: 0 | COMMUNITY
Start: 2018-11-25

## 2018-12-13 RX ORDER — CLOPIDOGREL BISULFATE 75 MG/1
TABLET ORAL
Refills: 1 | COMMUNITY
Start: 2018-12-03

## 2018-12-13 RX ORDER — LISINOPRIL 2.5 MG/1
TABLET ORAL
Refills: 0 | COMMUNITY
Start: 2018-11-25 | End: 2019-05-23 | Stop reason: ALTCHOICE

## 2018-12-13 NOTE — ASSESSMENT & PLAN NOTE
Patient's cholesterol is followed very closely by his cardiologist   Patient remains on a high dose of atorvastatin at 40 mg a day  We did discuss again the importance of diet including restriction of his intakes of fats and cholesterol

## 2018-12-13 NOTE — ASSESSMENT & PLAN NOTE
As noted patient's blood pressure is slightly elevated from his last readings  Again patient will be starting in the near future cardiac rehab program   Because patient is now on Flomax he is experiencing episodes of orthostasis and I will not place him on any new medications in order to control his blood pressure depending on the results of blood pressure readings when in cardiac rehab

## 2018-12-13 NOTE — ASSESSMENT & PLAN NOTE
Patient had recent cardiac catheterization, placement of stents x3 to help with abnormal circulation to his coronary arteries  Patient states he does not feel appreciably different than previously  Again they are requesting that he started cardiac rehab program and I agree that this is extremely important for this patient and that he increase his exercise capacity which will be helpful for his cardiac health, blood pressure and also to help with his blood sugars

## 2018-12-13 NOTE — PROGRESS NOTES
Diabetic Foot Exam    Patient's shoes and socks removed  Right Foot/Ankle   Right Foot Inspection  Skin Exam: skin normal and skin intact no dry skin, no warmth, no callus, no erythema, no maceration, no abnormal color, no pre-ulcer, no ulcer and no callus                          Toe Exam: ROM and strength within normal limits  Sensory   Vibration: diminished  Proprioception: diminished   Monofilament testing: diminished  Vascular  Capillary refills: < 3 seconds  The right DP pulse is 1+  The right PT pulse is 1+  Left Foot/Ankle  Left Foot Inspection  Skin Exam: skin normal and skin intactno dry skin, no warmth, no erythema, no maceration, normal color, no pre-ulcer, no ulcer and no callus                         Toe Exam: ROM and strength within normal limits                   Sensory   Vibration: diminished  Proprioception: diminished  Monofilament: diminished  Vascular  Capillary refills: < 3 seconds  The left DP pulse is 1+  The left PT pulse is 1+  Assign Risk Category:  No deformity present;  No loss of protective sensation; Weak pulses       Risk: 0

## 2018-12-13 NOTE — ASSESSMENT & PLAN NOTE
Lab Results   Component Value Date    HGBA1C 8 7 (H) 12/06/2018       No results for input(s): POCGLU in the last 72 hours  Blood Sugar Average: Last 72 hrs:   patient has had a increase in his hemoglobin A1c since she was last seen substantially  Patient states he has been compliant with his diet but not as exercise program   We did discuss modification of treatment at this point time and the thoughts were starting the patient on medication such as Invokana but because of her recent urinary tract infection and urinary difficulties we have decided to withhold further treatment  He was told the other possible treatment at this point time would be placing the patient on insulin which he is supposed to  Patient is supposed to start a cardiac rehab program to look at his exercise capacity and hopefully with an increase in exercise patient will show better control of his blood sugars with the next readings  Diabetic foot exam was performed today    Patient continues to follow-up with his ophthalmologist

## 2018-12-13 NOTE — PROGRESS NOTES
BMI Counseling: Body mass index is 29 02 kg/m²  Discussed the patient's BMI with him  The BMI is above average  BMI counseling and education was provided to the patient  Nutrition recommendations include reducing portion sizes, decreasing overall calorie intake, 3-5 servings of fruits/vegetables daily and consuming healthier snacks  Exercise recommendations include moderate aerobic physical activity for 150 minutes/week

## 2018-12-13 NOTE — ASSESSMENT & PLAN NOTE
Patient relates and this is verified by a recent letter from his cardiologist that he was seen in a hospital outside of [address] for acute a urinary tract infection  Patient was placed on IV then p o  antibiotics and states that he is improving overall  He was essentially asymptomatic other than some slight decrease in the flow was urine  He states that he has a better urinary flow now that he is on Flomax but again some episodes of orthostasis    He does have a follow-up visit to be seen by Urology and further treatment is indicated

## 2018-12-13 NOTE — ASSESSMENT & PLAN NOTE
In the office today patient's heart rate is controlled, rhythm seems stable  Patient remains on oral anticoagulants    Again patient had recent full evaluation workup by his cardiologist

## 2018-12-13 NOTE — PROGRESS NOTES
Assessment/Plan:    DMII (diabetes mellitus, type 2) ([facility])  Lab Results   Component Value Date    HGBA1C 8 7 (H) 12/06/2018       No results for input(s): POCGLU in the last 72 hours  Blood Sugar Average: Last 72 hrs:   patient has had a increase in his hemoglobin A1c since she was last seen substantially  Patient states he has been compliant with his diet but not as exercise program   We did discuss modification of treatment at this point time and the thoughts were starting the patient on medication such as Invokana but because of her recent urinary tract infection and urinary difficulties we have decided to withhold further treatment  He was told the other possible treatment at this point time would be placing the patient on insulin which he is supposed to  Patient is supposed to start a cardiac rehab program to look at his exercise capacity and hopefully with an increase in exercise patient will show better control of his blood sugars with the next readings  Diabetic foot exam was performed today  Patient continues to follow-up with his ophthalmologist    Arteriosclerotic cardiovascular disease  Patient had recent cardiac catheterization, placement of stents x3 to help with abnormal circulation to his coronary arteries  Patient states he does not feel appreciably different than previously  Again they are requesting that he started cardiac rehab program and I agree that this is extremely important for this patient and that he increase his exercise capacity which will be helpful for his cardiac health, blood pressure and also to help with his blood sugars  Atrial fibrillation ([address] )  In the office today patient's heart rate is controlled, rhythm seems stable  Patient remains on oral anticoagulants  Again patient had recent full evaluation workup by his cardiologist    Hypertension  As noted patient's blood pressure is slightly elevated from his last readings    Again patient will be starting in the near future cardiac rehab program   Because patient is now on Flomax he is experiencing episodes of orthostasis and I will not place him on any new medications in order to control his blood pressure depending on the results of blood pressure readings when in cardiac rehab  Hyperlipidemia  Patient's cholesterol is followed very closely by his cardiologist   Patient remains on a high dose of atorvastatin at 40 mg a day  We did discuss again the importance of diet including restriction of his intakes of fats and cholesterol  Acute cystitis without hematuria  Patient relates and this is verified by a recent letter from his cardiologist that he was seen in a hospital outside of [place] for acute a urinary tract infection  Patient was placed on IV then p o  antibiotics and states that he is improving overall  He was essentially asymptomatic other than some slight decrease in the flow was urine  He states that he has a better urinary flow now that he is on Flomax but again some episodes of orthostasis  He does have a follow-up visit to be seen by Urology and further treatment is indicated       Diagnoses and all orders for this visit:    Type 2 diabetes mellitus without complication, without long-term current use of insulin ([facility] [address] )  -     Basic metabolic panel; Future  -     Hemoglobin A1C; Future    Arteriosclerotic cardiovascular disease    Paroxysmal atrial fibrillation (HCC)    Essential hypertension    Familial hypercholesterolemia    Other orders  -     tamsulosin (FLOMAX) 0 4 mg; TK ONE C PO QHS  -     lisinopril (ZESTRIL) 2 5 mg tablet; TK 1 T PO QD  -     clopidogrel (PLAVIX) 75 mg tablet; TK 1 T PO D          Subjective:      Patient ID: Prasanna Ennis is a 74 y o  male  Patient is a 77-year-old male with a history of multiple medical problems as outlined previously  Patient is here today for routine follow-up    Since his last visit patient was admitted to the hospital with acute urinary tract infection and treated appropriately  Patient also has undergone cardiac catheterization and placement of stents x3 for progression of his coronary artery disease  They are suggesting that he start cardiac rehab and I agree  Patient otherwise states he is feeling well and he has no new complaints or problems        The following portions of the patient's history were reviewed and updated as appropriate:   He  has no past medical history on file  He   Patient Active Problem List    Diagnosis Date Noted    Acute cystitis without hematuria 12/13/2018    Atrial fibrillation ([facility] ) 12/20/2017    Hypertension 09/19/2014    Arteriosclerotic cardiovascular disease 09/20/2012    DMII (diabetes mellitus, type 2) ([place] [address] ) 09/20/2012    Hyperlipidemia 09/20/2012     He  has a past surgical history that includes No past surgeries  His family history includes No Known Problems in his brother, father, maternal grandfather, maternal grandmother, mother, paternal grandfather, paternal grandmother, and sister  He  reports that he has quit smoking  He has never used smokeless tobacco  He reports that he does not drink alcohol  His drug history is not on file    Current Outpatient Prescriptions   Medication Sig Dispense Refill    aspirin 81 MG tablet Take by mouth      atorvastatin (LIPITOR) 40 mg tablet Take 1 tablet (40 mg total) by mouth daily (Patient taking differently: Take 80 mg by mouth daily  ) 90 tablet 0    Calcium Carb-Cholecalciferol (CALCIUM CARBONATE-VITAMIN D3 PO) Take by mouth      clopidogrel (PLAVIX) 75 mg tablet TK 1 T PO D  1    DAILY MULTIPLE VITAMINS tablet Take by mouth      ELIQUIS 5 MG       lisinopril (ZESTRIL) 2 5 mg tablet TK 1 T PO QD  0    metFORMIN (GLUCOPHAGE-XR) 750 mg 24 hr tablet Take 1 tablet (750 mg total) by mouth 2 (two) times a day 180 tablet 0    sitaGLIPtin (JANUVIA) 100 mg tablet Take 1 tablet (100 mg total) by mouth daily 90 tablet 1    sotalol (BETAPACE) 80 mg tablet Take 40 mg by mouth 2 (two) times a day        tamsulosin (FLOMAX) 0 4 mg TK ONE C PO QHS  0     No current facility-administered medications for this visit  Current Outpatient Prescriptions on File Prior to Visit   Medication Sig    aspirin 81 MG tablet Take by mouth    atorvastatin (LIPITOR) 40 mg tablet Take 1 tablet (40 mg total) by mouth daily (Patient taking differently: Take 80 mg by mouth daily  )    Calcium Carb-Cholecalciferol (CALCIUM CARBONATE-VITAMIN D3 PO) Take by mouth    DAILY MULTIPLE VITAMINS tablet Take by mouth    ELIQUIS 5 MG     metFORMIN (GLUCOPHAGE-XR) 750 mg 24 hr tablet Take 1 tablet (750 mg total) by mouth 2 (two) times a day    sitaGLIPtin (JANUVIA) 100 mg tablet Take 1 tablet (100 mg total) by mouth daily    sotalol (BETAPACE) 80 mg tablet Take 40 mg by mouth 2 (two) times a day       No current facility-administered medications on file prior to visit  He has No Known Allergies       Review of Systems   Constitutional: Positive for activity change (Patient states that since his cardiac catheterization he is not as physically active but he intends to start cardiac rehab in the near future)  Negative for appetite change, chills, diaphoresis, fatigue, fever and unexpected weight change  HENT: Negative  Eyes: Negative  Respiratory: Negative  Cardiovascular: Negative  Gastrointestinal: Negative  Endocrine: Negative  Genitourinary: Positive for difficulty urinating  Negative for decreased urine volume, discharge, dysuria, enuresis, flank pain, frequency, genital sores, hematuria, penile pain, penile swelling, scrotal swelling, testicular pain and urgency  Again patient was recently hospitalized with an acute urinary tract infection  He is complaining of some decreased urinary flow prior to that event  Patient was treated with antibiotics and placed on Flomax     Musculoskeletal: Positive for arthralgias ( some minor diffuse arthritic aches and pains but nothing new or disabling at this time)  Negative for back pain, gait problem, joint swelling, myalgias, neck pain and neck stiffness  Allergic/Immunologic: Negative  Neurological: Positive for light-headedness ( some episodes of orthostasis since the patient has been placed on Flomax)  Negative for dizziness, tremors, seizures, syncope, facial asymmetry, speech difficulty, weakness, numbness and headaches  Hematological: Negative  Psychiatric/Behavioral: Negative  Objective:      /72   Pulse 71   Temp (!) 96 2 °F (35 7 °C)   Ht 6' (1 829 m)   Wt 97 1 kg (214 lb)   SpO2 98%   BMI 29 02 kg/m²          Physical Exam   Constitutional: He is oriented to person, place, and time  He appears well-developed and well-nourished  No distress  Pleasant, overweight 40-year-old male who is awake alert no acute distress and oriented x3  Patient is accompanied today by his wife   HENT:   Head: Normocephalic and atraumatic  Right Ear: External ear normal    Left Ear: External ear normal    Nose: Nose normal    Mouth/Throat: Oropharynx is clear and moist  No oropharyngeal exudate  Eyes: Pupils are equal, round, and reactive to light  Conjunctivae and EOM are normal  Right eye exhibits no discharge  Left eye exhibits no discharge  No scleral icterus  Neck: Normal range of motion  Neck supple  No JVD present  No tracheal deviation present  No thyromegaly present  Cardiovascular: Normal rate, regular rhythm, normal heart sounds and intact distal pulses  Exam reveals no gallop and no friction rub  No murmur heard  Pulmonary/Chest: Effort normal and breath sounds normal  No stridor  No respiratory distress  He has no wheezes  He has no rales  He exhibits no tenderness  Abdominal: Soft  Bowel sounds are normal  He exhibits no distension and no mass  There is no tenderness  There is no rebound and no guarding  Obese   Musculoskeletal: He exhibits deformity   He exhibits no edema or tenderness  Some diffuse arthritis and most notably to the hands and digits, deformity to right arm from previous accidental injury, no new changes   Lymphadenopathy:     He has no cervical adenopathy  Neurological: He is alert and oriented to person, place, and time  He has normal reflexes  He displays normal reflexes  No cranial nerve deficit  He exhibits normal muscle tone  Coordination normal    Skin: Skin is warm and dry  No rash noted  He is not diaphoretic  No erythema  No pallor  Psychiatric: He has a normal mood and affect  His behavior is normal  Judgment and thought content normal    Nursing note and vitals reviewed

## 2018-12-13 NOTE — PATIENT INSTRUCTIONS
Low Fat Diet   AMBULATORY CARE:   A low-fat diet  is an eating plan that is low in total fat, unhealthy fat, and cholesterol  You may need to follow a low-fat diet if you have trouble digesting or absorbing fat  You may also need to follow this diet if you have high cholesterol  You can also lower your cholesterol by increasing the amount of fiber in your diet  Soluble fiber is a type of fiber that helps to decrease cholesterol levels  Different types of fat in food:   · Limit unhealthy fats  A diet that is high in cholesterol, saturated fat, and trans fat may cause unhealthy cholesterol levels  Unhealthy cholesterol levels increase your risk of heart disease  ¨ Cholesterol:  Limit intake of cholesterol to less than 200 mg per day  Cholesterol is found in meat, eggs, and dairy  ¨ Saturated fat:  Limit saturated fat to less than 7% of your total daily calories  Ask your dietitian how many calories you need each day  Saturated fat is found in butter, cheese, ice cream, whole milk, and palm oil  Saturated fat is also found in meat, such as beef, pork, chicken skin, and processed meats  Processed meats include sausage, hot dogs, and bologna  ¨ Trans fat:  Avoid trans fat as much as possible  Trans fat is used in fried and baked foods  Foods that say trans fat free on the label may still have up to 0 5 grams of trans fat per serving  · Include healthy fats  Replace foods that are high in saturated and trans fat with foods high in healthy fats  This may help to decrease high cholesterol levels  ¨ Monounsaturated fats: These are found in avocados, nuts, and vegetable oils, such as olive, canola, and sunflower oil  ¨ Polyunsaturated fats: These can be found in vegetable oils, such as soybean or corn oil  Omega-3 fats can help to decrease the risk of heart disease  Omega-3 fats are found in fish, such as salmon, herring, trout, and tuna   Omega-3 fats can also be found in plant foods, such as walnuts, flaxseed, soybeans, and canola oil    Foods to limit or avoid:   · Grains:      ¨ Snacks that are made with partially hydrogenated oils, such as chips, regular crackers, and butter-flavored popcorn    ¨ High-fat baked goods, such as biscuits, croissants, doughnuts, pies, cookies, and pastries    · Dairy:      ¨ Whole milk, 2% milk, and yogurt and ice cream made with whole milk    ¨ Half and half creamer, heavy cream, and whipping cream    ¨ Cheese, cream cheese, and sour cream    · Meats and proteins:      ¨ High-fat cuts of meat (T-bone steak, regular hamburger, and ribs)    ¨ Fried meat, poultry (turkey and chicken), and fish    ¨ Poultry (chicken and turkey) with skin    ¨ Cold cuts (salami or bologna), hot dogs, zapata, and sausage    ¨ Whole eggs and egg yolks    · Vegetables and fruits with added fat:      ¨ Fried vegetables or vegetables in butter or high-fat sauces, such as cream or cheese sauces    ¨ Fried fruit or fruit served with butter or cream    · Fats:      ¨ Butter, stick margarine, and shortening    ¨ Coconut, palm oil, and palm kernel oil  Foods to include:   · Grains:      ¨ Whole-grain breads, cereals, pasta, and brown rice    ¨ Low-fat crackers and pretzels    · Vegetables and fruits:      ¨ Fresh, frozen, or canned vegetables (no salt or low-sodium)    ¨ Fresh, frozen, dried, or canned fruit (canned in light syrup or fruit juice)    ¨ Avocado    · Low-fat dairy products:      ¨ Nonfat (skim) or 1% milk    ¨ Nonfat or low-fat cheese, yogurt, and cottage cheese    · Meats and proteins:      ¨ Chicken or turkey with no skin    ¨ Baked or broiled fish    ¨ Lean beef and pork (loin, round, extra lean hamburger)    ¨ Beans and peas, unsalted nuts, soy products    ¨ Egg whites and substitutes    ¨ Seeds and nuts    · Fats:      ¨ Unsaturated oil, such as canola, olive, peanut, soybean, or sunflower oil    ¨ Soft or liquid margarine and vegetable oil spread    ¨ Low-fat salad dressing  Other ways to decrease fat:   · Read food labels before you buy foods  Choose foods that have less than 30% of calories from fat  Choose low-fat or fat-free dairy products  Remember that fat free does not mean calorie free  These foods still contain calories, and too many calories can lead to weight gain  · Trim fat from meat and avoid fried food  Trim all visible fat from meat before you cook it  Remove the skin from poultry  Do not [place] meat, fish, or poultry  Bake, roast, boil, or broil these foods instead  Avoid fried foods  Eat a baked potato instead of Western [place] fries  Steam vegetables instead of sautéing them in butter  · Add less fat to foods  Use imitation zapata bits on salads and baked potatoes instead of regular zapata bits  Use fat-free or low-fat salad dressings instead of regular dressings  Use low-fat or nonfat butter-flavored topping instead of regular butter or margarine on popcorn and other foods  Ways to decrease fat in recipes:  Replace high-fat ingredients with low-fat or nonfat ones  This may cause baked goods to be drier than usual  You may need to use nonfat cooking spray on pans to prevent food from sticking  You also may need to change the amount of other ingredients, such as water, in the recipe  Try the following:  · Use low-fat or light margarine instead of regular margarine or shortening  · Use lean ground turkey breast or chicken, or lean ground beef (less than 5% fat) instead of hamburger  · Add 1 teaspoon of canola oil to 8 ounces of skim milk instead of using cream or half and half  · Use grated zucchini, carrots, or apples in breads instead of coconut  · Use blenderized, low-fat cottage cheese, plain tofu, or low-fat ricotta cheese instead of cream cheese  · Use 1 egg white and 1 teaspoon of canola oil, or use ¼ cup (2 ounces) of fat-free egg substitute instead of a whole egg       · Replace half of the oil that is called for in a recipe with applesauce when you bake  Use 3 tablespoons of cocoa powder and 1 tablespoon of canola oil instead of a square of baking chocolate  How to increase fiber:  Eat enough high-fiber foods to get 20 to 30 grams of fiber every day  Slowly increase your fiber intake to avoid stomach cramps, gas, and other problems  · Eat 3 ounces of whole-grain foods each day  An ounce is about 1 slice of bread  Eat whole-grain breads, such as whole-wheat bread  Whole wheat, whole-wheat flour, or other whole grains should be listed as the first ingredient on the food label  Replace white flour with whole-grain flour or use half of each in recipes  Whole-grain flour is heavier than white flour, so you may have to add more yeast or baking powder  · Eat a high-fiber cereal for breakfast   Oatmeal is a good source of soluble fiber  Look for cereals that have bran or fiber in the name  Choose whole-grain products, such as brown rice, barley, and whole-wheat pasta  · Eat more beans, peas, and lentils  For example, add beans to soups or salads  Eat at least 5 cups of fruits and vegetables each day  Eat fruits and vegetables with the peel because the peel is high in fiber  © 2017 [employer] Information is for End User's use only and may not be sold, redistributed or otherwise used for commercial purposes  All illustrations and images included in CareNotes® are the copyrighted property of [employer]  or [employer]  The above information is an  only  It is not intended as medical advice for individual conditions or treatments  Talk to your doctor, nurse or pharmacist before following any medical regimen to see if it is safe and effective for you  Heart Healthy Diet   AMBULATORY CARE:   A heart healthy diet  is an eating plan low in total fat, unhealthy fats, and sodium (salt)  A heart healthy diet helps decrease your risk for heart disease and stroke   Limit the amount of fat you eat to 25% to 35% of your total daily calories  Limit sodium to less than 2,300 mg each day  Healthy fats:  Healthy fats can help improve cholesterol levels  The risk for heart disease is decreased when cholesterol levels are normal  Choose healthy fats, such as the following:  · Unsaturated fat  is found in foods such as soybean, canola, olive, corn, and safflower oils  It is also found in soft tub margarine that is made with liquid vegetable oil  · Omega-3 fat  is found in certain fish, such as salmon, tuna, and trout, and in walnuts and flaxseed  Unhealthy fats:  Unhealthy fats can cause unhealthy cholesterol levels in your blood and increase your risk of heart disease  Limit unhealthy fats, such as the following:  · Cholesterol  is found in animal foods, such as eggs and lobster, and in dairy products made from whole milk  Limit cholesterol to less than 300 milligrams (mg) each day  You may need to limit cholesterol to 200 mg each day if you have heart disease  · Saturated fat  is found in meats, such as zapata and hamburger  It is also found in chicken or turkey skin, whole milk, and butter  Limit saturated fat to less than 7% of your total daily calories  Limit saturated fat to less than 6% if you have heart disease or are at increased risk for it  · Trans fat  is found in packaged foods, such as potato chips and cookies  It is also in hard margarine, some fried foods, and shortening  Avoid trans fats as much as possible    Heart healthy foods and drinks to include:  Ask your dietitian or healthcare provider how many servings to have from each of the following food groups:  · Grains:      ¨ Whole-wheat breads, cereals, and pastas, and brown rice    ¨ Low-fat, low-sodium crackers and chips    · Vegetables:      ¨ Broccoli, green beans, green peas, and spinach    ¨ Collards, kale, and lima beans    ¨ Carrots, sweet potatoes, tomatoes, and peppers    ¨ Canned vegetables with no salt added    · Fruits:      ¨ Bananas, peaches, pears, and pineapple    ¨ Grapes, raisins, and dates    ¨ Oranges, tangerines, grapefruit, orange juice, and grapefruit juice    ¨ Apricots, mangoes, melons, and papaya    ¨ Raspberries and strawberries    ¨ Canned fruit with no added sugar    · Low-fat dairy products:      ¨ Nonfat (skim) milk, 1% milk, and low-fat almond, cashew, or soy milks fortified with calcium    ¨ Low-fat cheese, regular or frozen yogurt, and cottage cheese    · Meats and proteins , such as lean cuts of beef and pork (loin, leg, round), skinless chicken and turkey, legumes, soy products, egg whites, and nuts  Foods and drinks to limit or avoid:  Ask your dietitian or healthcare provider about these and other foods that are high in unhealthy fat, sodium, and sugar:  · Snack or packaged foods , such as frozen dinners, cookies, macaroni and cheese, and cereals with more than 300 mg of sodium per serving    · Canned or dry mixes  for cakes, soups, sauces, or gravies    · Vegetables with added sodium , such as instant potatoes, vegetables with added sauces, or regular canned vegetables    · Other foods high in sodium , such as ketchup, barbecue sauce, salad dressing, pickles, olives, soy sauce, and miso    · High-fat dairy foods  such as whole or 2% milk, cream cheese, or sour cream, and cheeses     · High-fat protein foods  such as high-fat cuts of beef (T-bone steaks, ribs), chicken or turkey with skin, and organ meats, such as liver    · Cured or smoked meats , such as hot dogs, zapata, and sausage    · Unhealthy fats and oils , such as butter, stick margarine, shortening, and cooking oils such as coconut or palm oil    · Food and drinks high in sugar , such as soft drinks (soda), sports drinks, sweetened tea, candy, cake, cookies, pies, and doughnuts  Other diet guidelines to follow:   · Eat more foods containing omega-3 fats  Eat fish high in omega-3 fats at least 2 times a week  · Limit alcohol    Too much alcohol can damage your heart and raise your blood pressure  Women should limit alcohol to 1 drink a day  Men should limit alcohol to 2 drinks a day  A drink of alcohol is 12 ounces of beer, 5 ounces of wine, or 1½ ounces of liquor  · Choose low-sodium foods  High-sodium foods can lead to high blood pressure  Add little or no salt to food you prepare  Use herbs and spices in place of salt  · Eat more fiber  to help lower cholesterol levels  Eat at least 5 servings of fruits and vegetables each day  Eat 3 ounces of whole-grain foods each day  Legumes (beans) are also a good source of fiber  Lifestyle guidelines:   · Do not smoke  Nicotine and other chemicals in cigarettes and cigars can cause lung and heart damage  Ask your healthcare provider for information if you currently smoke and need help to quit  E-cigarettes or smokeless tobacco still contain nicotine  Talk to your healthcare provider before you use these products  · Exercise regularly  to help you maintain a healthy weight and improve your blood pressure and cholesterol levels  Ask your healthcare provider about the best exercise plan for you  Do not start an exercise program without asking your healthcare provider  Follow up with your healthcare provider as directed:  Write down your questions so you remember to ask them during your visits  © 2017 [facility] Information is for End User's use only and may not be sold, redistributed or otherwise used for commercial purposes  All illustrations and images included in CareNotes® are the copyrighted property of [employer]  or [employer]angel[employer]  The above information is an  only  It is not intended as medical advice for individual conditions or treatments  Talk to your doctor, nurse or pharmacist before following any medical regimen to see if it is safe and effective for you  Calorie Counting Diet   WHAT YOU NEED TO KNOW:   What is a calorie counting diet?   It is a meal plan based on counting calories each day to reach a healthy body weight  You will need to eat fewer calories if you are trying to lose weight  Weight loss may decrease your risk for certain health problems or improve your health if you have health problems  Some of these health problems include heart disease, high blood pressure, and diabetes  What foods should I avoid? Your dietitian will tell you if you need to avoid certain foods based on your body weight and health condition  You may need to avoid high-fat foods if you are at risk for or have heart disease  You may need to eat fewer foods from the breads and starches food group if you have diabetes  How many calories are in foods? The following is a list of foods and drinks with the approximate number of calories in each  Check the food label to find the exact number of calories  A dietitian can tell you how many calories you should have from each food group each day    · Carbohydrate:      ¨ ½ of a 3-inch bagel, 1 slice of bread, or ½ of a hamburger bun or hot dog bun (80)    ¨ 1 (8-inch) flour tortilla or ½ cup of cooked rice (100)    ¨ 1 (6-inch) corn tortilla (80)    ¨ 1 (6-inch) pancake or 1 cup of bran flakes cereal (110)    ¨ ½ cup of cooked cereal (80)    ¨ ½ cup of cooked pasta (85)    ¨ 1 ounce of pretzels (100)    ¨ 3 cups of air-popped popcorn without butter or oil (80)    · Dairy:      ¨ 1 cup of skim or 1% milk (90)    ¨ 1 cup of 2% milk (120)    ¨ 1 cup of whole milk (160)    ¨ 1 cup of 2% chocolate milk (220)    ¨ 1 ounce of low-fat cheese with 3 grams of fat per ounce (70)    ¨ 1 ounce of cheddar cheese (114)    ¨ ½ cup of 1% fat cottage cheese (80)    ¨ 1 cup of plain or sugar-free, fat-free yogurt (90)    · Protein foods:      ¨ 3 ounces of fish (not breaded or fried) (95)    ¨ 3 ounces of breaded, fried fish (195)    ¨ ¾ cup of tuna canned in water (105)    ¨ 3 ounces of chicken breast without skin (105)    ¨ 1 fried chicken breast with skin (350)    ¨ ¼ cup of fat free egg substitute (40)    ¨ 1 large egg (75)    ¨ 3 ounces of lean beef or pork (165)    ¨ 3 ounces of fried pork chop or ham (185)    ¨ ½ cup of cooked dried beans, such as kidney, orellana, lentils, or navy (115)    ¨ 3 ounces of bologna or lunch meat (225)    ¨ 2 links of breakfast sausage (140)    · Vegetables:      ¨ ½ cup of sliced mushrooms (10)    ¨ 1 cup of salad greens, such as lettuce, spinach, or ha (15)    ¨ ½ cup of steamed asparagus (20)    ¨ ½ cup of cooked summer squash, zucchini squash, or green or wax beans (25)    ¨ 1 cup of broccoli or cauliflower florets, or 1 medium tomato (25)    ¨ 1 large raw carrot or ½ cup of cooked carrots (40)    ¨ ? of a medium cucumber or 1 stalk of celery (5)    ¨ 1 small baked potato (160)    ¨ 1 cup of breaded, fried vegetables (230)    · Fruit:      ¨ 1 (6-inch) banana (55)     ¨ ½ of a 4-inch grapefruit (55)    ¨ 15 grapes (60)    ¨ 1 medium orange or apple (70)    ¨ 1 large peach (65)    ¨ 1 cup of fresh pineapple chunks (75)    ¨ 1 cup of melon cubes (50)    ¨ 1¼ cups of whole strawberries (45)    ¨ ½ cup of fruit canned in juice (55)    ¨ ½ cup of fruit canned in heavy syrup (110)    ¨ ?  cup of raisins (130)    ¨ ½ cup of unsweetened fruit juice (60)    ¨ ½ cup of grape, cranberry, or prune juice (90)    · Fat:      ¨ 10 peanuts or 2 teaspoons of peanut butter (55)    ¨ 2 tablespoons of avocado or 1 tablespoon of regular salad dressing (45)    ¨ 2 slices of zapata (90)    ¨ 1 teaspoon of oil, such as safflower, canola, corn, or olive oil (45)    ¨ 2 teaspoons of low-fat margarine, or 1 tablespoon of low-fat mayonnaise (50)    ¨ 1 teaspoon of regular margarine (40)    ¨ 1 tablespoon of regular mayonnaise (135)    ¨ 1 tablespoon of cream cheese or 2 tablespoons of low-fat cream cheese (45)    ¨ 2 tablespoons of vegetable shortening (215)    · Dessert and sweets:      ¨ 8 animal crackers or 5 vanilla wafers (80)    ¨ 1 frozen fruit juice bar (80)    ¨ ½ cup of ice milk or low-fat frozen yogurt (90)    ¨ ½ cup of sherbet or sorbet (125)    ¨ ½ cup of sugar-free pudding or custard (60)    ¨ ½ cup of ice cream (140)    ¨ ½ cup of pudding or custard (175)    ¨ 1 (2-inch) square chocolate brownie (185)    · Combination foods:      ¨ Bean burrito made with an 8-inch tortilla, without cheese (275)    ¨ Chicken breast sandwich with lettuce and tomato (325)    ¨ 1 cup of chicken noodle soup (60)    ¨ 1 beef taco (175)    ¨ Regular hamburger with lettuce and tomato (310)    ¨ Regular cheeseburger with lettuce and tomato (410)     ¨ ¼ of a 12-inch cheese pizza (280)    ¨ Fried fish sandwich with lettuce and tomato (425)    ¨ Hot dog and bun (275)    ¨ 1½ cups of macaroni and cheese (310)    ¨ Taco salad with a fried tortilla shell (870)    · Low-calorie foods:      ¨ 1 tablespoon of ketchup or 1 tablespoon of fat free sour cream (15)    ¨ 1 teaspoon of mustard (5)    ¨ ¼ cup of salsa (20)    ¨ 1 large dill pickle (15)    ¨ 1 tablespoon of fat free salad dressing (10)    ¨ 2 teaspoons of low-sugar, light jam or jelly, or 1 tablespoon of sugar-free syrup (15)    ¨ 1 sugar-free popsicle (15)    ¨ 1 cup of club soda, seltzer water, or diet soda (0)  CARE AGREEMENT:   You have the right to help plan your care  Discuss treatment options with your caregivers to decide what care you want to receive  You always have the right to refuse treatment  The above information is an  only  It is not intended as medical advice for individual conditions or treatments  Talk to your doctor, nurse or pharmacist before following any medical regimen to see if it is safe and effective for you  © 2017 [employer] Information is for End User's use only and may not be sold, redistributed or otherwise used for commercial purposes   All illustrations and images included in CareNotes® are the copyrighted property of [employer]  or [employer]

## 2018-12-14 LAB — LABCORP COMMENT: NORMAL

## 2019-05-23 ENCOUNTER — OFFICE VISIT (OUTPATIENT)
Dept: INTERNAL MEDICINE CLINIC | Facility: CLINIC | Age: 74
End: 2019-05-23
Payer: COMMERCIAL

## 2019-05-23 VITALS
TEMPERATURE: 96.4 F | SYSTOLIC BLOOD PRESSURE: 124 MMHG | BODY MASS INDEX: 28.58 KG/M2 | HEIGHT: 72 IN | HEART RATE: 63 BPM | OXYGEN SATURATION: 98 % | DIASTOLIC BLOOD PRESSURE: 72 MMHG | WEIGHT: 211 LBS

## 2019-05-23 DIAGNOSIS — E78.01 FAMILIAL HYPERCHOLESTEROLEMIA: ICD-10-CM

## 2019-05-23 DIAGNOSIS — I10 ESSENTIAL HYPERTENSION: ICD-10-CM

## 2019-05-23 DIAGNOSIS — I48.0 PAROXYSMAL ATRIAL FIBRILLATION (HCC): ICD-10-CM

## 2019-05-23 DIAGNOSIS — I25.10 ARTERIOSCLEROTIC CARDIOVASCULAR DISEASE: ICD-10-CM

## 2019-05-23 DIAGNOSIS — Z12.5 PROSTATE CANCER SCREENING: ICD-10-CM

## 2019-05-23 DIAGNOSIS — Z12.11 COLON CANCER SCREENING: ICD-10-CM

## 2019-05-23 DIAGNOSIS — E66.3 OVERWEIGHT (BMI 25.0-29.9): ICD-10-CM

## 2019-05-23 DIAGNOSIS — E11.9 TYPE 2 DIABETES MELLITUS WITHOUT COMPLICATION, WITHOUT LONG-TERM CURRENT USE OF INSULIN (HCC): Primary | ICD-10-CM

## 2019-05-23 PROCEDURE — 1036F TOBACCO NON-USER: CPT | Performed by: INTERNAL MEDICINE

## 2019-05-23 PROCEDURE — 3074F SYST BP LT 130 MM HG: CPT | Performed by: INTERNAL MEDICINE

## 2019-05-23 PROCEDURE — 99214 OFFICE O/P EST MOD 30 MIN: CPT | Performed by: INTERNAL MEDICINE

## 2019-05-23 PROCEDURE — 3078F DIAST BP <80 MM HG: CPT | Performed by: INTERNAL MEDICINE

## 2019-05-23 PROCEDURE — 1160F RVW MEDS BY RX/DR IN RCRD: CPT | Performed by: INTERNAL MEDICINE

## 2019-05-23 RX ORDER — EZETIMIBE 10 MG/1
10 TABLET ORAL DAILY
COMMUNITY

## 2019-05-28 LAB — HBA1C MFR BLD: 7.4 % (ref 4.8–5.6)

## 2019-08-08 LAB
LEFT EYE DIABETIC RETINOPATHY: NORMAL
RIGHT EYE DIABETIC RETINOPATHY: NORMAL
SEVERITY (EYE EXAM): NORMAL

## 2019-08-16 DIAGNOSIS — E11.9 TYPE 2 DIABETES MELLITUS WITHOUT COMPLICATION, WITHOUT LONG-TERM CURRENT USE OF INSULIN (HCC): ICD-10-CM

## 2019-08-16 RX ORDER — SITAGLIPTIN 100 MG/1
TABLET, FILM COATED ORAL
Qty: 90 TABLET | Refills: 3 | Status: SHIPPED | OUTPATIENT
Start: 2019-08-16

## 2019-09-06 LAB
ALBUMIN SERPL-MCNC: 4.6 G/DL (ref 3.5–4.8)
ALBUMIN/GLOB SERPL: 2.3 {RATIO} (ref 1.2–2.2)
ALP SERPL-CCNC: 79 IU/L (ref 39–117)
ALT SERPL-CCNC: 29 IU/L (ref 0–44)
AST SERPL-CCNC: 19 IU/L (ref 0–40)
BASOPHILS # BLD AUTO: 0 X10E3/UL (ref 0–0.2)
BASOPHILS NFR BLD AUTO: 0 %
BILIRUB SERPL-MCNC: 0.9 MG/DL (ref 0–1.2)
BUN SERPL-MCNC: 30 MG/DL (ref 8–27)
BUN/CREAT SERPL: 19 (ref 10–24)
CALCIUM SERPL-MCNC: 9.5 MG/DL (ref 8.6–10.2)
CHLORIDE SERPL-SCNC: 101 MMOL/L (ref 96–106)
CHOLEST SERPL-MCNC: 120 MG/DL (ref 100–199)
CO2 SERPL-SCNC: 20 MMOL/L (ref 20–29)
CREAT SERPL-MCNC: 1.55 MG/DL (ref 0.76–1.27)
EOSINOPHIL # BLD AUTO: 0.3 X10E3/UL (ref 0–0.4)
EOSINOPHIL NFR BLD AUTO: 3 %
ERYTHROCYTE [DISTWIDTH] IN BLOOD BY AUTOMATED COUNT: 13.4 % (ref 12.3–15.4)
GLOBULIN SER-MCNC: 2 G/DL (ref 1.5–4.5)
GLUCOSE SERPL-MCNC: 282 MG/DL (ref 65–99)
HBA1C MFR BLD: 8.2 % (ref 4.8–5.6)
HCT VFR BLD AUTO: 41.1 % (ref 37.5–51)
HDLC SERPL-MCNC: 33 MG/DL
HGB BLD-MCNC: 13.6 G/DL (ref 13–17.7)
IMM GRANULOCYTES # BLD: 0 X10E3/UL (ref 0–0.1)
IMM GRANULOCYTES NFR BLD: 0 %
LDLC SERPL CALC-MCNC: 46 MG/DL (ref 0–99)
LYMPHOCYTES # BLD AUTO: 1.3 X10E3/UL (ref 0.7–3.1)
LYMPHOCYTES NFR BLD AUTO: 17 %
MCH RBC QN AUTO: 29.3 PG (ref 26.6–33)
MCHC RBC AUTO-ENTMCNC: 33.1 G/DL (ref 31.5–35.7)
MCV RBC AUTO: 89 FL (ref 79–97)
MONOCYTES # BLD AUTO: 0.5 X10E3/UL (ref 0.1–0.9)
MONOCYTES NFR BLD AUTO: 6 %
NEUTROPHILS # BLD AUTO: 5.7 X10E3/UL (ref 1.4–7)
NEUTROPHILS NFR BLD AUTO: 74 %
PLATELET # BLD AUTO: 188 X10E3/UL (ref 150–450)
POTASSIUM SERPL-SCNC: 5.1 MMOL/L (ref 3.5–5.2)
PROT SERPL-MCNC: 6.6 G/DL (ref 6–8.5)
PSA SERPL-MCNC: 1.6 NG/ML (ref 0–4)
RBC # BLD AUTO: 4.64 X10E6/UL (ref 4.14–5.8)
SL AMB EGFR AFRICAN AMERICAN: 50 ML/MIN/1.73
SL AMB EGFR NON AFRICAN AMERICAN: 43 ML/MIN/1.73
SL AMB VLDL CHOLESTEROL CALC: 41 MG/DL (ref 5–40)
SODIUM SERPL-SCNC: 138 MMOL/L (ref 134–144)
TRIGL SERPL-MCNC: 204 MG/DL (ref 0–149)
WBC # BLD AUTO: 7.8 X10E3/UL (ref 3.4–10.8)

## 2019-09-12 ENCOUNTER — OFFICE VISIT (OUTPATIENT)
Dept: INTERNAL MEDICINE CLINIC | Facility: CLINIC | Age: 74
End: 2019-09-12
Payer: COMMERCIAL

## 2019-09-12 VITALS
TEMPERATURE: 97.6 F | WEIGHT: 218.6 LBS | SYSTOLIC BLOOD PRESSURE: 118 MMHG | OXYGEN SATURATION: 99 % | HEART RATE: 60 BPM | HEIGHT: 72 IN | DIASTOLIC BLOOD PRESSURE: 64 MMHG | BODY MASS INDEX: 29.61 KG/M2

## 2019-09-12 DIAGNOSIS — I48.0 PAROXYSMAL ATRIAL FIBRILLATION (HCC): ICD-10-CM

## 2019-09-12 DIAGNOSIS — Z23 ENCOUNTER FOR IMMUNIZATION: Primary | ICD-10-CM

## 2019-09-12 DIAGNOSIS — I10 ESSENTIAL HYPERTENSION: ICD-10-CM

## 2019-09-12 DIAGNOSIS — E78.01 FAMILIAL HYPERCHOLESTEROLEMIA: ICD-10-CM

## 2019-09-12 DIAGNOSIS — Z00.00 MEDICARE ANNUAL WELLNESS VISIT, SUBSEQUENT: ICD-10-CM

## 2019-09-12 DIAGNOSIS — E11.69 TYPE 2 DIABETES MELLITUS WITH OTHER SPECIFIED COMPLICATION, WITHOUT LONG-TERM CURRENT USE OF INSULIN (HCC): ICD-10-CM

## 2019-09-12 PROCEDURE — 3078F DIAST BP <80 MM HG: CPT | Performed by: INTERNAL MEDICINE

## 2019-09-12 PROCEDURE — 3074F SYST BP LT 130 MM HG: CPT | Performed by: INTERNAL MEDICINE

## 2019-09-12 PROCEDURE — 90662 IIV NO PRSV INCREASED AG IM: CPT

## 2019-09-12 PROCEDURE — 99214 OFFICE O/P EST MOD 30 MIN: CPT | Performed by: INTERNAL MEDICINE

## 2019-09-12 PROCEDURE — 1125F AMNT PAIN NOTED PAIN PRSNT: CPT

## 2019-09-12 PROCEDURE — 1170F FXNL STATUS ASSESSED: CPT

## 2019-09-12 PROCEDURE — G0008 ADMIN INFLUENZA VIRUS VAC: HCPCS

## 2019-09-12 PROCEDURE — G0439 PPPS, SUBSEQ VISIT: HCPCS | Performed by: INTERNAL MEDICINE

## 2019-09-12 RX ORDER — METFORMIN HYDROCHLORIDE 750 MG/1
750 TABLET, EXTENDED RELEASE ORAL 2 TIMES DAILY
Qty: 180 TABLET | Refills: 0 | Status: SHIPPED | OUTPATIENT
Start: 2019-09-12 | End: 2019-11-23 | Stop reason: SDUPTHER

## 2019-09-12 NOTE — ASSESSMENT & PLAN NOTE
Patient is a 68-year-old male with a history of multiple medical problems as outlined previously including hypertension, coronary artery disease, chronic atrial fibrillation, diabetes mellitus type 2, exogenous obesity  Patient is here today for Medicare wellness visit  He did have labs performed prior to the visit today we did discuss the results  Patient's hemoglobin A1c is now to up to 8 3 and patient admits that he has not been watching his diet especially recently on vacation with his wife and family  He continues to follow-up with cardiology  He states he has no new complaints but his wife did state that he has some increasing problems with fatigue  He denies any chest pain or pressure and no increasing shortness of breath with exertion  He states his appetite is good and robust   Denies any bowel or bladder concerns  Did undergo physical exam today including diabetic foot exam   He does have evidence of diabetic peripheral neuropathy    We discussed problems with his blood sugar in the importance of making an appointment in near future to see an endocrinologist

## 2019-09-12 NOTE — PROGRESS NOTES
Assessment/Plan:    Medicare annual wellness visit, subsequent  Patient is a 18-year-old male with a history of multiple medical problems as outlined previously including hypertension, coronary artery disease, chronic atrial fibrillation, diabetes mellitus type 2, exogenous obesity  Patient is here today for Medicare wellness visit  He did have labs performed prior to the visit today we did discuss the results  Patient's hemoglobin A1c is now to up to 8 3 and patient admits that he has not been watching his diet especially recently on vacation with his wife and family  He continues to follow-up with cardiology  He states he has no new complaints but his wife did state that he has some increasing problems with fatigue  He denies any chest pain or pressure and no increasing shortness of breath with exertion  He states his appetite is good and robust   Denies any bowel or bladder concerns  Did undergo physical exam today including diabetic foot exam   He does have evidence of diabetic peripheral neuropathy  We discussed problems with his blood sugar in the importance of making an appointment in near future to see an endocrinologist    Hyperlipidemia  History of hyperlipidemia, coronary artery disease  He remains on atorvastatin at 40 mg a day  His cholesterol is still not to goal especially with the lower HDL  We did reinforce the importance of routine exercise, watching his intake of fats and cholesterol with his diet  He was told he has high risk for recurrent disease not only because of his cholesterol but also because of his diabetes    Hypertension  Blood pressure is controlled  Renal function is stable    Atrial fibrillation ([facility])  Patient rate and rhythm today are normal in the office  Patient remains on oral anticoagulants for his history of atrial fibrillation  Continues to follow-up with cardiology      DMII (diabetes mellitus, type 2) ([facility])  Lab Results   Component Value Date    HGBA1C 8 2 (H) 09/05/2019       No results for input(s): POCGLU in the last 72 hours  Blood Sugar Average: Last 72 hrs:   hemoglobin A1c is up to a point to  Again patient admits that a major component of poor control of his cholesterol is secondary to his extremely poor the diet, lack of any regular exercise  We continue to show a yo-yo effect with his cholesterol readings and I did suggest the patient initiate evaluation and care with an endocrinologist in the area he lives in outside of [place]  Patient understands and agrees  I have also discuss this with his wife  Patient is up-to-date with routine eye exams  Is showing some signs of a peripheral neuropathy more so in his right than left foot  No claudication but decreased pedal pulses bilaterally       Diagnoses and all orders for this visit:    Encounter for immunization  -     influenza vaccine, [id number], high-dose, PF 0 5 mL (FLUZONE HIGH-DOSE)    Type 2 diabetes mellitus with other specified complication, without long-term current use of insulin (HCC)  -     metFORMIN (GLUCOPHAGE-XR) 750 mg 24 hr tablet; Take 1 tablet (750 mg total) by mouth 2 (two) times a day  -     One Touch Verio IQ  -     Glucometer test strips  -     Lancets  -     Basic metabolic panel; Future  -     Hemoglobin A1C; Future    Medicare annual wellness visit, subsequent    Familial hypercholesterolemia    Essential hypertension    Paroxysmal atrial fibrillation (HCC)          Subjective:      Patient ID: Lisa Wilcox is a 74 y o  male  57-year-old male with a history of multiple medical problems as outlined previously  Patient is here today for subsequent Medicare wellness visit  Patient states in general he is doing about the same and has no new complaints or concerns  He did have extensive lab testing performed prior to the visit today we did discuss the results  Patient's sugar showing extremely poor control with a hemoglobin A1c of 8 2    Patient admits that he has not been watching his diet closely he is taking his medication as previously prescribed  Over the years patient has had irregular control of his blood sugars mostly with poor compliance with diet and exercise  He is up-to-date with all routine screening otherwise  Diabetic foot exam was performed today  The following portions of the patient's history were reviewed and updated as appropriate:   He  has no past medical history on file  He   Patient Active Problem List    Diagnosis Date Noted    Medicare annual wellness visit, subsequent 09/12/2019    Overweight (BMI 25 0-29 9) 05/23/2019    Acute cystitis without hematuria 12/13/2018    Atrial fibrillation ([facility] ) 12/20/2017    Hypertension 09/19/2014    Arteriosclerotic cardiovascular disease 09/20/2012    [facility], [facility] 09/20/2012    Hyperlipidemia 09/20/2012     He  has a past surgical history that includes No past surgeries  His family history includes No Known Problems in his brother, father, maternal grandfather, maternal grandmother, mother, paternal grandfather, paternal grandmother, and sister  He  reports that he has quit smoking  He has never used smokeless tobacco  He reports that he does not drink alcohol  His drug history is not on file    Current Outpatient Medications   Medication Sig Dispense Refill    atorvastatin (LIPITOR) 40 mg tablet Take 1 tablet (40 mg total) by mouth daily (Patient taking differently: Take 80 mg by mouth daily  ) 90 tablet 0    Calcium Carb-Cholecalciferol (CALCIUM CARBONATE-VITAMIN D3 PO) Take by mouth      clopidogrel (PLAVIX) 75 mg tablet TK 1 T PO D  1    DAILY MULTIPLE VITAMINS tablet Take by mouth      ELIQUIS 5 MG       ezetimibe (ZETIA) 10 mg tablet Take 10 mg by mouth daily      JANUVIA 100 MG tablet TAKE 1 TABLET EVERY DAY 90 tablet 3    metFORMIN (GLUCOPHAGE-XR) 750 mg 24 hr tablet Take 1 tablet (750 mg total) by mouth 2 (two) times a day 180 tablet 0    sotalol (BETAPACE) 80 mg tablet Take 40 mg by mouth 2 (two) times a day        tamsulosin (FLOMAX) 0 4 mg TK ONE C PO QHS  0     No current facility-administered medications for this visit  Current Outpatient Medications on File Prior to Visit   Medication Sig    atorvastatin (LIPITOR) 40 mg tablet Take 1 tablet (40 mg total) by mouth daily (Patient taking differently: Take 80 mg by mouth daily  )    Calcium Carb-Cholecalciferol (CALCIUM CARBONATE-VITAMIN D3 PO) Take by mouth    clopidogrel (PLAVIX) 75 mg tablet TK 1 T PO D    DAILY MULTIPLE VITAMINS tablet Take by mouth    ELIQUIS 5 MG     ezetimibe (ZETIA) 10 mg tablet Take 10 mg by mouth daily    JANUVIA 100 MG tablet TAKE 1 TABLET EVERY DAY    sotalol (BETAPACE) 80 mg tablet Take 40 mg by mouth 2 (two) times a day      tamsulosin (FLOMAX) 0 4 mg TK ONE C PO QHS    [DISCONTINUED] metFORMIN (GLUCOPHAGE-XR) 750 mg 24 hr tablet Take 1 tablet (750 mg total) by mouth 2 (two) times a day     No current facility-administered medications on file prior to visit  He has No Known Allergies       Review of Systems   Constitutional: Positive for activity change (Even though patient states there has been no change in his activity level his wife does state that he is more sedentary than in the past)  Negative for appetite change, chills, diaphoresis, fatigue, fever and unexpected weight change  HENT: Negative  Eyes: Negative  Respiratory: Negative  Cardiovascular: Negative  Gastrointestinal: Negative  Endocrine: Negative  Genitourinary: Negative  Musculoskeletal: Negative  Skin: Negative  Allergic/Immunologic: Negative  Neurological: Negative  Hematological: Negative  Psychiatric/Behavioral: Negative  Objective:      /64   Pulse 60   Temp 97 6 °F (36 4 °C)   Ht 6' (1 829 m)   Wt 99 2 kg (218 lb 9 6 oz)   SpO2 99%   BMI 29 65 kg/m²          Physical Exam   Constitutional: He is oriented to person, place, and time   He appears well-developed and well-nourished  No distress  Pleasant, moderately overweight 41-year-old male who is awake alert no acute distress oriented x3   HENT:   Head: Normocephalic and atraumatic  Right Ear: External ear normal    Left Ear: External ear normal    Nose: Nose normal    Mouth/Throat: No oropharyngeal exudate  Pink but slightly dry oral mucous membranes   Eyes: Pupils are equal, round, and reactive to light  Conjunctivae and EOM are normal  Right eye exhibits no discharge  Left eye exhibits no discharge  No scleral icterus  Neck: Normal range of motion  Neck supple  No JVD present  No tracheal deviation present  No thyromegaly present  Cardiovascular: Normal rate, regular rhythm and normal heart sounds  Exam reveals no gallop and no friction rub  No murmur heard  Slightly decreased pulses to bilateral lower extremities dorsalis pedis and anterior tibial   Pulmonary/Chest: Effort normal and breath sounds normal  No stridor  No respiratory distress  He has no wheezes  He has no rales  He exhibits no tenderness  Abdominal: Soft  Bowel sounds are normal  He exhibits no distension and no mass  There is no tenderness  There is no rebound and no guarding  No hernia  Obese   Genitourinary: Rectum normal, prostate normal and penis normal  No penile tenderness  Musculoskeletal: Normal range of motion  He exhibits deformity  He exhibits no edema or tenderness  Some diffuse arthritic changes noted the hands and digits, flattening to his lumbar curve  No acute orthopedic lesions, no edema to lower extremities   Lymphadenopathy:     He has no cervical adenopathy  Neurological: He is alert and oriented to person, place, and time  He displays abnormal reflex (Absent patella and Achilles tendon reflexes bilaterally)  A sensory deficit ( some sensory defect in noted to the feet bilaterally on diabetic exam, monofilament) is present  No cranial nerve deficit  He exhibits normal muscle tone  Coordination normal    Skin: Skin is warm and dry  Capillary refill takes less than 2 seconds  No rash noted  He is not diaphoretic  No erythema  No pallor  Psychiatric: He has a normal mood and affect  His behavior is normal  Judgment and thought content normal    Nursing note and vitals reviewed

## 2019-09-12 NOTE — ASSESSMENT & PLAN NOTE
History of hyperlipidemia, coronary artery disease  He remains on atorvastatin at 40 mg a day  His cholesterol is still not to goal especially with the lower HDL  We did reinforce the importance of routine exercise, watching his intake of fats and cholesterol with his diet    He was told he has high risk for recurrent disease not only because of his cholesterol but also because of his diabetes

## 2019-09-12 NOTE — ASSESSMENT & PLAN NOTE
Patient rate and rhythm today are normal in the office  Patient remains on oral anticoagulants for his history of atrial fibrillation  Continues to follow-up with cardiology

## 2019-09-12 NOTE — PROGRESS NOTES
Assessment and Plan:     Problem List Items Addressed This Visit     None           Preventive health issues were discussed with patient, and age appropriate screening tests were ordered as noted in patient's After Visit Summary  Personalized health advice and appropriate referrals for health education or preventive services given if needed, as noted in patient's After Visit Summary  History of Present Illness:     Patient presents for Medicare Annual Wellness visit    Patient Care Team:  Rashmi Ko DO as PCP - General     Problem List:     Patient Active Problem List   Diagnosis    Arteriosclerotic cardiovascular disease    Atrial fibrillation ([facility]    [facility], [facility]    Hyperlipidemia    Hypertension    Acute cystitis without hematuria    Overweight (BMI 25 0-29  9)      Past Medical and Surgical History:     No past medical history on file    Past Surgical History:   Procedure Laterality Date    NO PAST SURGERIES        Family History:     Family History   Problem Relation Age of Onset    No Known Problems Mother     No Known Problems Father     No Known Problems Sister     No Known Problems Brother     No Known Problems Maternal Grandmother     No Known Problems Maternal Grandfather     No Known Problems Paternal Grandmother     No Known Problems Paternal Grandfather       Social History:     Social History     Socioeconomic History    Marital status: /Civil Union     Spouse name: None    Number of children: None    Years of education: None    Highest education level: None   Occupational History    None   Social Needs    Financial resource strain: None    Food insecurity:     Worry: None     Inability: None    Transportation needs:     Medical: None     Non-medical: None   Tobacco Use    Smoking status: Former Smoker    Smokeless tobacco: Never Used   Substance and Sexual Activity    Alcohol use: No    Drug use: None    Sexual activity: None Lifestyle    Physical activity:     Days per week: None     Minutes per session: None    Stress: None   Relationships    Social connections:     Talks on phone: None     Gets together: None     Attends Religion service: None     Active member of club or organization: None     Attends meetings of clubs or organizations: None     Relationship status: None    Intimate partner violence:     Fear of current or ex partner: None     Emotionally abused: None     Physically abused: None     Forced sexual activity: None   Other Topics Concern    None   Social History Narrative    None       Medications and Allergies:     Current Outpatient Medications   Medication Sig Dispense Refill    atorvastatin (LIPITOR) 40 mg tablet Take 1 tablet (40 mg total) by mouth daily (Patient taking differently: Take 80 mg by mouth daily  ) 90 tablet 0    Calcium Carb-Cholecalciferol (CALCIUM CARBONATE-VITAMIN D3 PO) Take by mouth      clopidogrel (PLAVIX) 75 mg tablet TK 1 T PO D  1    DAILY MULTIPLE VITAMINS tablet Take by mouth      ELIQUIS 5 MG       ezetimibe (ZETIA) 10 mg tablet Take 10 mg by mouth daily      JANUVIA 100 MG tablet TAKE 1 TABLET EVERY DAY 90 tablet 3    metFORMIN (GLUCOPHAGE-XR) 750 mg 24 hr tablet Take 1 tablet (750 mg total) by mouth 2 (two) times a day 180 tablet 0    sotalol (BETAPACE) 80 mg tablet Take 40 mg by mouth 2 (two) times a day        tamsulosin (FLOMAX) 0 4 mg TK ONE C PO QHS  0     No current facility-administered medications for this visit        No Known Allergies   Immunizations:     Immunization History   Administered Date(s) Administered    H1N1, All Formulations 01/08/2010    Influenza Split High Dose Preservative Free IM 09/03/2013, 10/03/2014, 11/16/2015, 12/02/2016, 10/31/2017    Influenza TIV (IM) 1943, 09/20/2012    Influenza, high dose seasonal 0 5 mL 09/20/2018    Pneumococcal Conjugate 13-Valent 12/02/2016      Health Maintenance:         Topic Date Due    CRC Screening: Colonoscopy  05/20/2025         Topic Date Due    HEPATITIS B VACCINES (1 of 3 - Risk 3-dose series) 10/17/1962    DTaP,Tdap,and Td Vaccines (1 - Tdap) 10/17/1964    Pneumococcal Vaccine: 65+ Years (2 of 2 - PPSV23) 12/02/2017    INFLUENZA VACCINE  07/01/2019      Medicare Health Risk Assessment:     /64   Pulse 60   Temp 97 6 °F (36 4 °C)   Ht 6' (1 829 m)   Wt 99 2 kg (218 lb 9 6 oz)   SpO2 99%   BMI 29 65 kg/m²      Ricki Covington is here for his Subsequent Wellness visit  Health Risk Assessment:   Patient rates overall health as very good  Patient feels that their physical health rating is same  Eyesight was rated as slightly worse  Hearing was rated as same  Patient feels that their emotional and mental health rating is same  Pain experienced in the last 7 days has been none  Patient states that he has experienced no weight loss or gain in last 6 months  Depression Screening:   PHQ-2 Score: 0      Fall Risk Screening: In the past year, patient has experienced: history of falling in past year    Number of falls: 1  Injured during fall?: No    Feels unsteady when standing or walking?: Yes    Worried about falling?: No      Home Safety:  Patient does not have trouble with stairs inside or outside of their home  Patient has working smoke alarms and has working carbon monoxide detector  Home safety hazards include: none  Nutrition:   Current diet is Regular and Low Carb  Medications:   Patient is currently taking over-the-counter supplements  OTC medications include: see medication list  Patient is able to manage medications  Activities of Daily Living (ADLs)/Instrumental Activities of Daily Living (IADLs):   Walk and transfer into and out of bed and chair?: Yes  Dress and groom yourself?: Yes    Bathe or shower yourself?: Yes    Feed yourself?  Yes  Do your laundry/housekeeping?: Yes  Manage your money, pay your bills and track your expenses?: Yes  Make your own meals?: Yes Do your own shopping?: Yes    Previous Hospitalizations:   Any hospitalizations or ED visits within the last 12 months?: Yes    How many hospitalizations have you had in the last year?: 1-2    Advance Care Planning:   Living will: No    Durable POA for healthcare: No    Advanced directive: No      PREVENTIVE SCREENINGS      Cardiovascular Screening:    General: Screening Not Indicated and History Lipid Disorder      Diabetes Screening:     General: Screening Not Indicated and History Diabetes      Colorectal Cancer Screening:     General: Screening Current      Prostate Cancer Screening:    General: Screening Not Indicated      Abdominal Aortic Aneurysm (AAA) Screening:    Risk factors include: age between 73-67 yo and tobacco use        Lung Cancer Screening:     General: Screening Not Indicated      Jesse Avila DO

## 2019-09-12 NOTE — ASSESSMENT & PLAN NOTE
Lab Results   Component Value Date    HGBA1C 8 2 (H) 09/05/2019       No results for input(s): POCGLU in the last 72 hours  Blood Sugar Average: Last 72 hrs:   hemoglobin A1c is up to a point to  Again patient admits that a major component of poor control of his cholesterol is secondary to his extremely poor the diet, lack of any regular exercise  We continue to show a yo-yo effect with his cholesterol readings and I did suggest the patient initiate evaluation and care with an endocrinologist in the area he lives in outside of [place]  Patient understands and agrees  I have also discuss this with his wife  Patient is up-to-date with routine eye exams  Is showing some signs of a peripheral neuropathy more so in his right than left foot    No claudication but decreased pedal pulses bilaterally

## 2019-09-12 NOTE — PROGRESS NOTES
Diabetic Foot Exam    Patient's shoes and socks removed  Right Foot/Ankle   Right Foot Inspection  Skin Exam: skin normal and skin intact no dry skin, no warmth, no callus, no erythema, no maceration, no abnormal color, no pre-ulcer, no ulcer and no callus                          Toe Exam: ROM and strength within normal limitsno swelling, no tenderness, erythema and  no right toe deformity  Sensory   Vibration: diminished  Proprioception: diminished   Monofilament testing: diminished  Vascular  Capillary refills: < 3 seconds  The right DP pulse is 1+  The right PT pulse is 1+  Left Foot/Ankle  Left Foot Inspection  Skin Exam: skin normal and skin intactno dry skin, no warmth, no erythema, no maceration, normal color, no pre-ulcer, no ulcer and no callus                         Toe Exam: ROM and strength within normal limitsno swelling, no tenderness, no erythema and no left toe deformity                   Sensory   Vibration: diminished  Proprioception: diminished  Monofilament: diminished  Vascular  Capillary refills: < 3 seconds  The left DP pulse is 1+  The left PT pulse is 1+  Assign Risk Category:  No deformity present; Loss of protective sensation;  No weak pulses       Risk: 1

## 2019-10-03 DIAGNOSIS — E11.69 TYPE 2 DIABETES MELLITUS WITH OTHER SPECIFIED COMPLICATION, WITHOUT LONG-TERM CURRENT USE OF INSULIN (HCC): Primary | ICD-10-CM

## 2019-11-23 DIAGNOSIS — E11.9 TYPE 2 DIABETES MELLITUS WITHOUT COMPLICATION, WITHOUT LONG-TERM CURRENT USE OF INSULIN (HCC): Primary | ICD-10-CM

## 2019-11-23 DIAGNOSIS — E11.69 TYPE 2 DIABETES MELLITUS WITH OTHER SPECIFIED COMPLICATION, WITHOUT LONG-TERM CURRENT USE OF INSULIN (HCC): ICD-10-CM

## 2019-11-25 RX ORDER — METFORMIN HYDROCHLORIDE 750 MG/1
TABLET, EXTENDED RELEASE ORAL
Qty: 180 TABLET | Refills: 0 | Status: SHIPPED | OUTPATIENT
Start: 2019-11-25

## 2019-11-25 RX ORDER — BLOOD SUGAR DIAGNOSTIC
STRIP MISCELLANEOUS
Qty: 100 EACH | Refills: 3 | Status: SHIPPED | OUTPATIENT
Start: 2019-11-25 | End: 2019-12-12 | Stop reason: SDUPTHER

## 2019-11-25 RX ORDER — LANCETS
EACH MISCELLANEOUS
Qty: 100 EACH | Refills: 0 | Status: SHIPPED | OUTPATIENT
Start: 2019-11-25 | End: 2020-02-06

## 2019-12-07 LAB
BUN SERPL-MCNC: 21 MG/DL (ref 8–27)
BUN/CREAT SERPL: 14 (ref 10–24)
CALCIUM SERPL-MCNC: 9.2 MG/DL (ref 8.6–10.2)
CHLORIDE SERPL-SCNC: 102 MMOL/L (ref 96–106)
CO2 SERPL-SCNC: 21 MMOL/L (ref 20–29)
CREAT SERPL-MCNC: 1.5 MG/DL (ref 0.76–1.27)
GLUCOSE SERPL-MCNC: 185 MG/DL (ref 65–99)
HBA1C MFR BLD: 8.3 % (ref 4.8–5.6)
POTASSIUM SERPL-SCNC: 4.2 MMOL/L (ref 3.5–5.2)
SL AMB EGFR AFRICAN AMERICAN: 52 ML/MIN/1.73
SL AMB EGFR NON AFRICAN AMERICAN: 45 ML/MIN/1.73
SODIUM SERPL-SCNC: 140 MMOL/L (ref 134–144)

## 2019-12-12 DIAGNOSIS — E11.9 TYPE 2 DIABETES MELLITUS WITHOUT COMPLICATION, WITHOUT LONG-TERM CURRENT USE OF INSULIN (HCC): ICD-10-CM

## 2019-12-12 RX ORDER — BLOOD SUGAR DIAGNOSTIC
STRIP MISCELLANEOUS
Qty: 100 EACH | Refills: 3 | Status: SHIPPED | OUTPATIENT
Start: 2019-12-12

## 2019-12-16 ENCOUNTER — OFFICE VISIT (OUTPATIENT)
Dept: INTERNAL MEDICINE CLINIC | Facility: CLINIC | Age: 74
End: 2019-12-16
Payer: COMMERCIAL

## 2019-12-16 VITALS
TEMPERATURE: 97.6 F | WEIGHT: 217 LBS | HEIGHT: 72 IN | DIASTOLIC BLOOD PRESSURE: 70 MMHG | OXYGEN SATURATION: 98 % | BODY MASS INDEX: 29.39 KG/M2 | SYSTOLIC BLOOD PRESSURE: 130 MMHG | HEART RATE: 61 BPM

## 2019-12-16 DIAGNOSIS — I25.10 ARTERIOSCLEROTIC CARDIOVASCULAR DISEASE: ICD-10-CM

## 2019-12-16 DIAGNOSIS — E11.9 TYPE 2 DIABETES MELLITUS WITHOUT COMPLICATION, WITHOUT LONG-TERM CURRENT USE OF INSULIN (HCC): Primary | ICD-10-CM

## 2019-12-16 DIAGNOSIS — E78.01 FAMILIAL HYPERCHOLESTEROLEMIA: ICD-10-CM

## 2019-12-16 DIAGNOSIS — I10 ESSENTIAL HYPERTENSION: ICD-10-CM

## 2019-12-16 DIAGNOSIS — I48.0 PAROXYSMAL ATRIAL FIBRILLATION (HCC): ICD-10-CM

## 2019-12-16 LAB
CREAT UR-MCNC: 208 MG/DL
MICROALBUMIN UR-MCNC: 60 MG/L (ref 0–20)
MICROALBUMIN/CREAT 24H UR: 29 MG/G CREATININE (ref 0–30)

## 2019-12-16 PROCEDURE — 1160F RVW MEDS BY RX/DR IN RCRD: CPT | Performed by: INTERNAL MEDICINE

## 2019-12-16 PROCEDURE — 3078F DIAST BP <80 MM HG: CPT | Performed by: INTERNAL MEDICINE

## 2019-12-16 PROCEDURE — 82570 ASSAY OF URINE CREATININE: CPT | Performed by: INTERNAL MEDICINE

## 2019-12-16 PROCEDURE — 3075F SYST BP GE 130 - 139MM HG: CPT | Performed by: INTERNAL MEDICINE

## 2019-12-16 PROCEDURE — 1036F TOBACCO NON-USER: CPT | Performed by: INTERNAL MEDICINE

## 2019-12-16 PROCEDURE — 99214 OFFICE O/P EST MOD 30 MIN: CPT | Performed by: INTERNAL MEDICINE

## 2019-12-16 PROCEDURE — 82043 UR ALBUMIN QUANTITATIVE: CPT | Performed by: INTERNAL MEDICINE

## 2019-12-16 NOTE — ASSESSMENT & PLAN NOTE
Blood pressure is acceptable  Patient will continue with present medication  The   His renal function continues to so some insufficiency    We will have this checked with his next visit in 4 months

## 2019-12-16 NOTE — PROGRESS NOTES
Assessment/Plan:    DMII (diabetes mellitus, type 2) ([facility] )    Lab Results   Component Value Date    HGBA1C 8 3 (H) 12/06/2019    Patient did have lab tests performed prior to the visit today  His hemoglobin A1c is still elevated at 8 3  Patient admits that he is not watching his diet but he is checking his blood sugars at home and he is beginning to realize that when he is not watching his diet his sugars will go up  Patient and his wife arrange for him to be seen by an endocrinologist in the near future and hopefully they can modify treatment in order to get better control of his blood sugars  A urine has been sent today for microalbumin patient is on ACE-inhibitor    Hypertension  Blood pressure is acceptable  Patient will continue with present medication  The   His renal function continues to so some insufficiency  We will have this checked with his next visit in 4 months    Atrial fibrillation ([facility] )  Patient's heart rate is controlled  Patient remains on Eliquis at 5 mg twice a day  No abnormal bleeding  Hyperlipidemia  Patient remains on a high dose of atorvastatin 4 mg per day  His cardiologist continues to monitor his lipid profiles  He admits that he is not compliant with his diet       Diagnoses and all orders for this visit:    Type 2 diabetes mellitus without complication, without long-term current use of insulin (HCC)  -     Microalbumin / creatinine urine ratio    Arteriosclerotic cardiovascular disease    Paroxysmal atrial fibrillation (HCC)    Familial hypercholesterolemia    Essential hypertension          Subjective:      Patient ID: Mason Corral is a 66 y o  male  75-year-old male with a history of multiple medical problems as outlined previously  Here today for routine follow-up  He did have lab tests performed prior to the visit today we did discuss the results    Patient finally has made an appointment to be seen by Endocrinology for further evaluation of his diabetes which is continuing to show poor control  Patient also has been watching his diet and checking his blood sugars which have been consistently elevated  He has noticed trend where he has not watching his diet as closely and his sugars will become elevated  He denies any new medical complaints or concerns      The following portions of the patient's history were reviewed and updated as appropriate: He  has no past medical history on file  He   Patient Active Problem List    Diagnosis Date Noted    Medicare annual wellness visit, subsequent 09/12/2019    Overweight (BMI 25 0-29 9) 05/23/2019    Acute cystitis without hematuria 12/13/2018    At[facility] fibrillation ([facility] ) 12/20/2017    Hypertension 09/19/2014    Arteriosclerotic cardiovascular disease 09/20/2012    DMII (diabetes mellitus, type 2) ([facility] ) 09/20/2012    Hyperlipidemia 09/20/2012     He  has a past surgical history that includes No past surgeries  His family history includes No Known Problems in his brother, father, maternal grandfather, maternal grandmother, mother, paternal grandfather, paternal grandmother, and sister  He  reports that he has quit smoking  He has never used smokeless tobacco  He reports that he does not drink alcohol  His drug history is not on file    Current Outpatient Medications   Medication Sig Dispense Refill    ACCU-CHEK ARVIND PLUS test strip USE EVERY DAY  TO CHECK BLOOD SUGAR 100 each 3    ACCU-CHEK SOFTCLIX LANCETS lancets TEST BLOOD SUGAR EVERY DAY AS DIRECTED 100 each 0    atorvastatin (LIPITOR) 40 mg tablet Take 1 tablet (40 mg total) by mouth daily (Patient taking differently: Take 80 mg by mouth daily  ) 90 tablet 0    Calcium Carb-Cholecalciferol (CALCIUM CARBONATE-VITAMIN D3 PO) Take by mouth      clopidogrel (PLAVIX) 75 mg tablet TK 1 T PO D  1    DAILY MULTIPLE VITAMINS tablet Take by mouth      ELIQUIS 5 MG       ezetimibe (ZETIA) 10 mg tablet Take 10 mg by mouth daily      JANUVIA 100 MG tablet TAKE 1 TABLET EVERY DAY 90 tablet 3    metFORMIN (GLUCOPHAGE-XR) 750 mg 24 hr tablet TAKE 1 TABLET TWICE DAILY 180 tablet 0    sotalol (BETAPACE) 80 mg tablet Take 40 mg by mouth 2 (two) times a day        tamsulosin (FLOMAX) 0 4 mg TK ONE C PO QHS  0     No current facility-administered medications for this visit  Current Outpatient Medications on File Prior to Visit   Medication Sig    ACCU-CHEK ARVIND PLUS test strip USE EVERY DAY  TO CHECK BLOOD SUGAR    ACCU-CHEK SOFTCLIX LANCETS lancets TEST BLOOD SUGAR EVERY DAY AS DIRECTED    atorvastatin (LIPITOR) 40 mg tablet Take 1 tablet (40 mg total) by mouth daily (Patient taking differently: Take 80 mg by mouth daily  )    Calcium Carb-Cholecalciferol (CALCIUM CARBONATE-VITAMIN D3 PO) Take by mouth    clopidogrel (PLAVIX) 75 mg tablet TK 1 T PO D    DAILY MULTIPLE VITAMINS tablet Take by mouth    ELIQUIS 5 MG     ezetimibe (ZETIA) 10 mg tablet Take 10 mg by mouth daily    JANUVIA 100 MG tablet TAKE 1 TABLET EVERY DAY    metFORMIN (GLUCOPHAGE-XR) 750 mg 24 hr tablet TAKE 1 TABLET TWICE DAILY    sotalol (BETAPACE) 80 mg tablet Take 40 mg by mouth 2 (two) times a day      tamsulosin (FLOMAX) 0 4 mg TK ONE C PO QHS     No current facility-administered medications on file prior to visit  He has No Known Allergies       Review of Systems   Constitutional: Negative  HENT: Negative  Eyes: Negative  Respiratory: Negative  Cardiovascular: Negative  Gastrointestinal: Negative  Endocrine: Negative  Genitourinary: Negative  Musculoskeletal: Positive for arthralgias (Some minor arthritic aches and pains but nothing new or disabling)  Negative for back pain, gait problem, joint swelling, myalgias, neck pain and neck stiffness  Skin: Negative  Allergic/Immunologic: Negative  Neurological: Negative  Hematological: Negative  Psychiatric/Behavioral: Negative            Objective:      /70   Pulse 61   Temp 97 6 °F (36 4 °C)   Ht 6' (1 829 m)   Wt 98 4 kg (217 lb)   SpO2 98%   BMI 29 43 kg/m²          Physical Exam   Constitutional: He is oriented to person, place, and time  He appears well-developed and well-nourished  No distress  Pleasant, articulate 49-year-old male who is awake alert no acute distress, overweight   HENT:   Head: Normocephalic and atraumatic  Right Ear: External ear normal    Left Ear: External ear normal    Nose: Nose normal    Mouth/Throat: Oropharynx is clear and moist  No oropharyngeal exudate  Eyes: Pupils are equal, round, and reactive to light  Conjunctivae and EOM are normal  Right eye exhibits no discharge  Left eye exhibits no discharge  No scleral icterus  Neck: Normal range of motion  Neck supple  No JVD present  No tracheal deviation present  No thyromegaly present  Cardiovascular: Normal rate, regular rhythm, normal heart sounds and intact distal pulses  Exam reveals no gallop and no friction rub  No murmur heard  Pulmonary/Chest: Effort normal and breath sounds normal  No stridor  No respiratory distress  He has no wheezes  He has no rales  He exhibits no tenderness  Abdominal: Soft  Bowel sounds are normal  He exhibits no distension and no mass  There is no tenderness  There is no rebound and no guarding  No hernia  Overweight   Musculoskeletal: Normal range of motion  He exhibits deformity  He exhibits no edema or tenderness  Lymphadenopathy:     He has no cervical adenopathy  Neurological: He is alert and oriented to person, place, and time  He displays normal reflexes  No cranial nerve deficit or sensory deficit  He exhibits normal muscle tone  Coordination normal    Skin: Skin is warm and dry  Capillary refill takes less than 2 seconds  No rash noted  He is not diaphoretic  No erythema  No pallor  Psychiatric: He has a normal mood and affect  His behavior is normal  Judgment and thought content normal    Nursing note and vitals reviewed

## 2019-12-16 NOTE — ASSESSMENT & PLAN NOTE
Lab Results   Component Value Date    HGBA1C 8 3 (H) 12/06/2019    Patient did have lab tests performed prior to the visit today  His hemoglobin A1c is still elevated at 8 3  Patient admits that he is not watching his diet but he is checking his blood sugars at home and he is beginning to realize that when he is not watching his diet his sugars will go up  Patient and his wife arrange for him to be seen by an endocrinologist in the near future and hopefully they can modify treatment in order to get better control of his blood sugars    A urine has been sent today for microalbumin patient is on ACE-inhibitor

## 2019-12-16 NOTE — ASSESSMENT & PLAN NOTE
Patient's heart rate is controlled  Patient remains on Eliquis at 5 mg twice a day  No abnormal bleeding

## 2019-12-16 NOTE — ASSESSMENT & PLAN NOTE
Patient remains on a high dose of atorvastatin 4 mg per day  His cardiologist continues to monitor his lipid profiles    He admits that he is not compliant with his diet

## 2020-02-06 DIAGNOSIS — E11.9 TYPE 2 DIABETES MELLITUS WITHOUT COMPLICATION, WITHOUT LONG-TERM CURRENT USE OF INSULIN (HCC): ICD-10-CM

## 2020-02-06 RX ORDER — LANCETS
EACH MISCELLANEOUS
Qty: 100 EACH | Refills: 0 | Status: SHIPPED | OUTPATIENT
Start: 2020-02-06 | End: 2020-02-28

## 2020-02-28 DIAGNOSIS — E11.9 TYPE 2 DIABETES MELLITUS WITHOUT COMPLICATION, WITHOUT LONG-TERM CURRENT USE OF INSULIN (HCC): ICD-10-CM

## 2020-02-28 RX ORDER — LANCETS
EACH MISCELLANEOUS
Qty: 100 EACH | Refills: 0 | Status: SHIPPED | OUTPATIENT
Start: 2020-02-28

## 2020-04-02 LAB
CREAT ?TM UR-SCNC: 65.6 UMOL/L
EXT MICROALBUMIN URINE RANDOM: 22.2
HBA1C MFR BLD HPLC: 6.3 %
MICROALBUMIN/CREAT UR: 34 MG/G{CREAT}

## 2020-06-11 LAB — HBA1C MFR BLD HPLC: 6 %

## 2020-06-19 ENCOUNTER — OFFICE VISIT (OUTPATIENT)
Dept: INTERNAL MEDICINE CLINIC | Facility: CLINIC | Age: 75
End: 2020-06-19
Payer: COMMERCIAL

## 2020-06-19 VITALS
DIASTOLIC BLOOD PRESSURE: 82 MMHG | BODY MASS INDEX: 28.25 KG/M2 | TEMPERATURE: 97.8 F | OXYGEN SATURATION: 98 % | SYSTOLIC BLOOD PRESSURE: 136 MMHG | RESPIRATION RATE: 16 BRPM | HEIGHT: 72 IN | HEART RATE: 72 BPM | WEIGHT: 208.6 LBS

## 2020-06-19 DIAGNOSIS — I10 ESSENTIAL HYPERTENSION: ICD-10-CM

## 2020-06-19 DIAGNOSIS — I25.10 ARTERIOSCLEROTIC CARDIOVASCULAR DISEASE: ICD-10-CM

## 2020-06-19 DIAGNOSIS — I48.0 PAROXYSMAL ATRIAL FIBRILLATION (HCC): ICD-10-CM

## 2020-06-19 DIAGNOSIS — E11.9 TYPE 2 DIABETES MELLITUS WITHOUT COMPLICATION, WITHOUT LONG-TERM CURRENT USE OF INSULIN (HCC): ICD-10-CM

## 2020-06-19 DIAGNOSIS — Z12.5 PROSTATE CANCER SCREENING: Primary | ICD-10-CM

## 2020-06-19 DIAGNOSIS — E66.3 OVERWEIGHT: ICD-10-CM

## 2020-06-19 DIAGNOSIS — E78.01 FAMILIAL HYPERCHOLESTEROLEMIA: ICD-10-CM

## 2020-06-19 PROCEDURE — 99214 OFFICE O/P EST MOD 30 MIN: CPT | Performed by: INTERNAL MEDICINE

## 2020-06-19 PROCEDURE — 3075F SYST BP GE 130 - 139MM HG: CPT | Performed by: INTERNAL MEDICINE

## 2020-06-19 PROCEDURE — 1036F TOBACCO NON-USER: CPT | Performed by: INTERNAL MEDICINE

## 2020-06-19 PROCEDURE — 3008F BODY MASS INDEX DOCD: CPT | Performed by: INTERNAL MEDICINE

## 2020-06-19 PROCEDURE — 4040F PNEUMOC VAC/ADMIN/RCVD: CPT | Performed by: INTERNAL MEDICINE

## 2020-06-19 PROCEDURE — 3079F DIAST BP 80-89 MM HG: CPT | Performed by: INTERNAL MEDICINE

## 2020-06-19 PROCEDURE — 1160F RVW MEDS BY RX/DR IN RCRD: CPT | Performed by: INTERNAL MEDICINE

## 2020-06-19 PROCEDURE — 2022F DILAT RTA XM EVC RTNOPTHY: CPT | Performed by: INTERNAL MEDICINE

## 2020-07-23 DIAGNOSIS — U07.1 COVID-19: ICD-10-CM

## 2020-07-23 PROCEDURE — U0003 INFECTIOUS AGENT DETECTION BY NUCLEIC ACID (DNA OR RNA); SEVERE ACUTE RESPIRATORY SYNDROME CORONAVIRUS 2 (SARS-COV-2) (CORONAVIRUS DISEASE [COVID-19]), AMPLIFIED PROBE TECHNIQUE, MAKING USE OF HIGH THROUGHPUT TECHNOLOGIES AS DESCRIBED BY CMS-2020-01-R: HCPCS

## 2020-07-24 LAB — SARS-COV-2 RNA SPEC QL NAA+PROBE: NOT DETECTED

## 2020-07-27 ENCOUNTER — ANESTHESIA EVENT (OUTPATIENT)
Dept: GASTROENTEROLOGY | Facility: HOSPITAL | Age: 75
End: 2020-07-27

## 2020-07-28 ENCOUNTER — ANESTHESIA (OUTPATIENT)
Dept: GASTROENTEROLOGY | Facility: HOSPITAL | Age: 75
End: 2020-07-28

## 2020-07-28 ENCOUNTER — HOSPITAL ENCOUNTER (OUTPATIENT)
Dept: GASTROENTEROLOGY | Facility: HOSPITAL | Age: 75
Setting detail: OUTPATIENT SURGERY
Discharge: HOME | End: 2020-07-28
Attending: SURGERY
Payer: COMMERCIAL

## 2020-07-28 VITALS
OXYGEN SATURATION: 97 % | HEART RATE: 66 BPM | DIASTOLIC BLOOD PRESSURE: 63 MMHG | RESPIRATION RATE: 18 BRPM | SYSTOLIC BLOOD PRESSURE: 138 MMHG | TEMPERATURE: 96.3 F

## 2020-07-28 DIAGNOSIS — Z86.010 PERSONAL HISTORY OF COLONIC POLYPS: ICD-10-CM

## 2020-07-28 DIAGNOSIS — K57.32 DIVERTICULITIS OF LARGE INTESTINE WITHOUT PERFORATION OR ABSCESS WITHOUT BLEEDING: ICD-10-CM

## 2020-07-28 PROCEDURE — 88305 TISSUE EXAM BY PATHOLOGIST: CPT | Performed by: PATHOLOGY

## 2020-07-28 RX ORDER — SODIUM CHLORIDE, SODIUM LACTATE, POTASSIUM CHLORIDE, CALCIUM CHLORIDE 600; 310; 30; 20 MG/100ML; MG/100ML; MG/100ML; MG/100ML
75 INJECTION, SOLUTION INTRAVENOUS CONTINUOUS
Status: DISCONTINUED | OUTPATIENT
Start: 2020-07-28 | End: 2020-08-01 | Stop reason: HOSPADM

## 2020-07-28 RX ORDER — PROPOFOL 10 MG/ML
INJECTION, EMULSION INTRAVENOUS CONTINUOUS PRN
Status: DISCONTINUED | OUTPATIENT
Start: 2020-07-28 | End: 2020-07-28 | Stop reason: SURG

## 2020-07-28 RX ORDER — LIDOCAINE HYDROCHLORIDE 10 MG/ML
INJECTION, SOLUTION EPIDURAL; INFILTRATION; INTRACAUDAL; PERINEURAL AS NEEDED
Status: DISCONTINUED | OUTPATIENT
Start: 2020-07-28 | End: 2020-07-28 | Stop reason: SURG

## 2020-07-28 RX ORDER — SODIUM CHLORIDE 9 MG/ML
125 INJECTION, SOLUTION INTRAVENOUS CONTINUOUS
Status: DISCONTINUED | OUTPATIENT
Start: 2020-07-28 | End: 2020-08-01 | Stop reason: HOSPADM

## 2020-07-28 RX ORDER — PROPOFOL 10 MG/ML
INJECTION, EMULSION INTRAVENOUS AS NEEDED
Status: DISCONTINUED | OUTPATIENT
Start: 2020-07-28 | End: 2020-07-28 | Stop reason: SURG

## 2020-07-28 RX ADMIN — SODIUM CHLORIDE 125 ML/HR: 0.9 INJECTION, SOLUTION INTRAVENOUS at 08:01

## 2020-07-28 RX ADMIN — LIDOCAINE HYDROCHLORIDE 50 MG: 10 INJECTION, SOLUTION EPIDURAL; INFILTRATION; INTRACAUDAL; PERINEURAL at 08:44

## 2020-07-28 RX ADMIN — PROPOFOL 50 MG: 10 INJECTION, EMULSION INTRAVENOUS at 08:45

## 2020-07-28 RX ADMIN — PROPOFOL 100 MCG/KG/MIN: 10 INJECTION, EMULSION INTRAVENOUS at 08:47

## 2020-07-28 RX ADMIN — PROPOFOL 50 MG: 10 INJECTION, EMULSION INTRAVENOUS at 08:47

## 2020-07-28 NOTE — ANESTHESIA POSTPROCEDURE EVALUATION
Post-Op Assessment Note    CV Status:  Stable  Pain Score: 0    Pain management: adequate     Mental Status:  Alert and awake   Hydration Status:  Euvolemic   PONV Controlled:  Controlled   Airway Patency:  Patent   Post Op Vitals Reviewed: Yes      Staff: CRNA   Comments: Pt awake, able to maintain own airway, VSS on RA, report to GI recovery RN          BP 93/52 (07/28/20 0906)    Temp     Pulse 68 (07/28/20 0906)   Resp 18 (07/28/20 0906)    SpO2 96 % (07/28/20 0906)

## 2020-07-28 NOTE — ANESTHESIA PREPROCEDURE EVALUATION
Review of Systems/Medical History  Patient summary reviewed  Chart reviewed  No history of anesthetic complications     Cardiovascular  Hyperlipidemia, Hypertension , Dysrhythmias , atrial fibrillation, PVD,    Pulmonary  Smoker ex-smoker  ,        GI/Hepatic            Endo/Other  Diabetes type 2 ,      GYN       Hematology    Coagulation disorder (plavix therapy) currently taking oral anticoagulants,    Musculoskeletal       Neurology   Psychology           Physical Exam    Airway    Mallampati score: II  TM Distance: >3 FB  Neck ROM: full     Dental       Cardiovascular  Rhythm: regular, Rate: normal,     Pulmonary  Breath sounds clear to auscultation,     Other Findings        Anesthesia Plan  ASA Score- 3     Anesthesia Type- IV sedation with anesthesia with ASA Monitors  Additional Monitors:   Airway Plan:         Plan Factors-    Induction- intravenous  Postoperative Plan-     Informed Consent- Anesthetic plan and risks discussed with patient  I personally reviewed this patient with the CRNA  Discussed and agreed on the Anesthesia Plan with the CRNA  Eusebio Marcano

## 2020-10-07 LAB — PSA SERPL-MCNC: 2.4 NG/ML (ref 0–4)

## 2020-10-19 ENCOUNTER — OFFICE VISIT (OUTPATIENT)
Dept: INTERNAL MEDICINE CLINIC | Facility: CLINIC | Age: 75
End: 2020-10-19
Payer: COMMERCIAL

## 2020-10-19 VITALS
HEART RATE: 65 BPM | DIASTOLIC BLOOD PRESSURE: 54 MMHG | WEIGHT: 208 LBS | OXYGEN SATURATION: 98 % | BODY MASS INDEX: 28.17 KG/M2 | SYSTOLIC BLOOD PRESSURE: 104 MMHG | HEIGHT: 72 IN | TEMPERATURE: 97 F

## 2020-10-19 DIAGNOSIS — E11.9 TYPE 2 DIABETES MELLITUS WITHOUT COMPLICATION, WITHOUT LONG-TERM CURRENT USE OF INSULIN (HCC): ICD-10-CM

## 2020-10-19 DIAGNOSIS — E78.01 FAMILIAL HYPERCHOLESTEROLEMIA: ICD-10-CM

## 2020-10-19 DIAGNOSIS — I10 ESSENTIAL HYPERTENSION: ICD-10-CM

## 2020-10-19 DIAGNOSIS — I25.10 ARTERIOSCLEROTIC CARDIOVASCULAR DISEASE: ICD-10-CM

## 2020-10-19 DIAGNOSIS — Z23 ENCOUNTER FOR IMMUNIZATION: Primary | ICD-10-CM

## 2020-10-19 DIAGNOSIS — I48.0 PAROXYSMAL ATRIAL FIBRILLATION (HCC): ICD-10-CM

## 2020-10-19 DIAGNOSIS — E66.3 OVERWEIGHT: ICD-10-CM

## 2020-10-19 DIAGNOSIS — Z00.00 MEDICARE ANNUAL WELLNESS VISIT, SUBSEQUENT: ICD-10-CM

## 2020-10-19 PROCEDURE — 1170F FXNL STATUS ASSESSED: CPT | Performed by: INTERNAL MEDICINE

## 2020-10-19 PROCEDURE — 99214 OFFICE O/P EST MOD 30 MIN: CPT | Performed by: INTERNAL MEDICINE

## 2020-10-19 PROCEDURE — 90662 IIV NO PRSV INCREASED AG IM: CPT

## 2020-10-19 PROCEDURE — G0008 ADMIN INFLUENZA VIRUS VAC: HCPCS

## 2020-10-19 PROCEDURE — 1160F RVW MEDS BY RX/DR IN RCRD: CPT | Performed by: INTERNAL MEDICINE

## 2020-10-19 PROCEDURE — 3074F SYST BP LT 130 MM HG: CPT | Performed by: INTERNAL MEDICINE

## 2020-10-19 PROCEDURE — 3725F SCREEN DEPRESSION PERFORMED: CPT | Performed by: INTERNAL MEDICINE

## 2020-10-19 PROCEDURE — 1125F AMNT PAIN NOTED PAIN PRSNT: CPT | Performed by: INTERNAL MEDICINE

## 2020-10-19 PROCEDURE — 3078F DIAST BP <80 MM HG: CPT | Performed by: INTERNAL MEDICINE

## 2020-10-19 PROCEDURE — G0439 PPPS, SUBSEQ VISIT: HCPCS | Performed by: INTERNAL MEDICINE

## 2020-10-19 PROCEDURE — 1036F TOBACCO NON-USER: CPT | Performed by: INTERNAL MEDICINE

## 2020-10-19 RX ORDER — PEN NEEDLE, DIABETIC 32GX 5/32"
NEEDLE, DISPOSABLE MISCELLANEOUS
COMMUNITY
Start: 2020-07-22

## 2021-02-12 DIAGNOSIS — Z23 ENCOUNTER FOR IMMUNIZATION: ICD-10-CM

## 2021-04-01 ENCOUNTER — TELEPHONE (OUTPATIENT)
Dept: INTERNAL MEDICINE CLINIC | Facility: CLINIC | Age: 76
End: 2021-04-01

## 2021-06-15 ENCOUNTER — RA CDI HCC (OUTPATIENT)
Dept: OTHER | Facility: HOSPITAL | Age: 76
End: 2021-06-15

## 2021-06-15 NOTE — PROGRESS NOTES
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

## 2021-06-21 ENCOUNTER — OFFICE VISIT (OUTPATIENT)
Dept: INTERNAL MEDICINE CLINIC | Facility: CLINIC | Age: 76
End: 2021-06-21
Payer: COMMERCIAL

## 2021-06-21 VITALS
HEIGHT: 72 IN | OXYGEN SATURATION: 97 % | SYSTOLIC BLOOD PRESSURE: 118 MMHG | WEIGHT: 208 LBS | DIASTOLIC BLOOD PRESSURE: 60 MMHG | TEMPERATURE: 97.8 F | HEART RATE: 62 BPM | BODY MASS INDEX: 28.17 KG/M2

## 2021-06-21 DIAGNOSIS — I25.10 ARTERIOSCLEROTIC CARDIOVASCULAR DISEASE: Primary | ICD-10-CM

## 2021-06-21 DIAGNOSIS — I48.0 PAROXYSMAL ATRIAL FIBRILLATION (HCC): ICD-10-CM

## 2021-06-21 DIAGNOSIS — Z00.00 MEDICARE ANNUAL WELLNESS VISIT, SUBSEQUENT: ICD-10-CM

## 2021-06-21 DIAGNOSIS — E66.3 OVERWEIGHT: ICD-10-CM

## 2021-06-21 DIAGNOSIS — I10 ESSENTIAL HYPERTENSION: ICD-10-CM

## 2021-06-21 DIAGNOSIS — E11.9 TYPE 2 DIABETES MELLITUS WITHOUT COMPLICATION, WITHOUT LONG-TERM CURRENT USE OF INSULIN (HCC): ICD-10-CM

## 2021-06-21 PROCEDURE — 3725F SCREEN DEPRESSION PERFORMED: CPT | Performed by: INTERNAL MEDICINE

## 2021-06-21 PROCEDURE — 1036F TOBACCO NON-USER: CPT | Performed by: INTERNAL MEDICINE

## 2021-06-21 PROCEDURE — 1160F RVW MEDS BY RX/DR IN RCRD: CPT | Performed by: INTERNAL MEDICINE

## 2021-06-21 PROCEDURE — 3074F SYST BP LT 130 MM HG: CPT | Performed by: INTERNAL MEDICINE

## 2021-06-21 PROCEDURE — 99214 OFFICE O/P EST MOD 30 MIN: CPT | Performed by: INTERNAL MEDICINE

## 2021-06-21 PROCEDURE — 3078F DIAST BP <80 MM HG: CPT | Performed by: INTERNAL MEDICINE

## 2021-06-21 RX ORDER — ATORVASTATIN CALCIUM 80 MG/1
TABLET, FILM COATED ORAL
COMMUNITY
Start: 2021-05-19

## 2021-06-21 NOTE — ASSESSMENT & PLAN NOTE
Patient is considered overweight  States he has reduced his caloric intake and this may be a side effect of his medication  No appreciable weight loss since his last visit  We did urged the patient to look closely at his caloric intake in order to promote weight loss  He does have some restrictions especially now paresthesia in his right leg and he has no regular exercise program which is suggested

## 2021-06-21 NOTE — PROGRESS NOTES
Assessment/Plan:    Arteriosclerotic cardiovascular disease   Patient continues to follow-up the with his cardiologist in [place]  Because of jaw claudication with exertion he is undergoing repeat stress test and evaluation with a known history of coronary artery disease  They continue to monitor his cholesterol and blood pressure    Atrial fibrillation [facility])   A cardiologist continues to monitor his heart rate  Remains on oral anticoagulants including Eliquis and Plavix  DMII (diabetes mellitus, type 2) ([facility] )    Lab Results   Component Value Date    HGBA1C 6 0 06/11/2020     Continues to follow-up with endocrinologist, podiatrist and ophthalmologist   Brock Roman have shown adequate control with present treatment  His only complaint is the cost of medication which continues to increase period    Hypertension   Blood pressure is controlled  Patient continue present medication and surveillance  He continues also to have surveillance of renal function  Medicare annual wellness visit, subsequent    Patient will be due for repeat Medicare wellness visit when he returns the office in 6 months  Patient is up-to-date with all lab testing  Patient was told prior to his next visit if any questions or concerns to please call  Overweight    Patient is considered overweight  States he has reduced his caloric intake and this may be a side effect of his medication  No appreciable weight loss since his last visit  We did urged the patient to look closely at his caloric intake in order to promote weight loss  He does have some restrictions especially now paresthesia in his right leg and he has no regular exercise program which is suggested         Diagnoses and all orders for this visit:    Arteriosclerotic cardiovascular disease    Paroxysmal atrial fibrillation ([facility] )    Type 2 diabetes mellitus without complication, without long-term current use of insulin ([facility] )    Essential hypertension    Medicare annual wellness visit, subsequent    Overweight    Other orders  -     atorvastatin (LIPITOR) 80 mg tablet          Subjective:      Patient ID: Swapna Cole is a 66 y o  male  75-year-old male history of multiple medical problems as outlined previously including hypertension, diabetes mellitus type 2, coronary artery disease status post stent placements in the past, DJD  Patient is here today for routine follow-up  He did bring in the results of lab testing that was performed by his cardiologist and endocrinologist   Patient relates that he is doing relatively well but has new paresthesia in his right leg from his foot up to his knee, some claudication, jaw pain with ambulation  Is to undergo a routine stress test in the near future  The following portions of the patient's history were reviewed and updated as appropriate:   He  has a past medical history of Afib ([facility] ), History of colon resection, coronary angioplasty, and Right arm fracture (1989)  He   Patient Active Problem List    Diagnosis Date Noted    Medicare annual wellness visit, subsequent 09/12/2019    Overweight 05/23/2019    Acute cystitis without hematuria 12/13/2018    [facility] ) 12/20/2017    Hypertension 09/19/2014    Arteriosclerotic cardiovascular disease 09/20/2012    DMII (diabetes mellitus, type 2) ([facility] ) 09/20/2012    Hyperlipidemia 09/20/2012     He  has no past surgical history on file  His family history includes No Known Problems in his brother, father, maternal grandfather, maternal grandmother, mother, paternal grandfather, paternal grandmother, and sister  He  reports that he has quit smoking  He has never used smokeless tobacco  He reports that he does not drink alcohol  No history on file for drug use    Current Outpatient Medications   Medication Sig Dispense Refill    ACCU-CHEK ARVIND PLUS test strip USE EVERY DAY  TO CHECK BLOOD SUGAR 100 each 3    Accu-Chek Softclix Lancets lancets TEST BLOOD SUGAR EVERY DAY AS DIRECTED 100 each 0    atorvastatin (LIPITOR) 80 mg tablet       Calcium Carb-Cholecalciferol (CALCIUM CARBONATE-VITAMIN D3 PO) Take by mouth      clopidogrel (PLAVIX) 75 mg tablet TK 1 T PO D  1    DAILY MULTIPLE VITAMINS tablet Take by mouth      Droplet Pen Needles 32G X 4 MM MISC       ELIQUIS 5 MG 5 mg 2 (two) times a day       ezetimibe (ZETIA) 10 mg tablet Take 10 mg by mouth daily      Insulin Degludec (Tresiba) 100 UNIT/ML SOLN Inject 20 Units under the skin      JANUVIA 100 MG tablet TAKE 1 TABLET EVERY DAY 90 tablet 3    metFORMIN (GLUCOPHAGE-XR) 750 mg 24 hr tablet TAKE 1 TABLET TWICE DAILY 180 tablet 0    Semaglutide (OZEMPIC, 0 25 OR 0 5 MG/DOSE, SC) Inject under the skin once a week      sotalol (BETAPACE) 80 mg tablet Take 40 mg by mouth 2 (two) times a day        tamsulosin (FLOMAX) 0 4 mg TK ONE C PO QHS  0     No current facility-administered medications for this visit       Current Outpatient Medications on File Prior to Visit   Medication Sig    ACCU-CHEK ARVIND PLUS test strip USE EVERY DAY  TO CHECK BLOOD SUGAR    Accu-Chek Softclix Lancets lancets TEST BLOOD SUGAR EVERY DAY AS DIRECTED    atorvastatin (LIPITOR) 80 mg tablet     Calcium Carb-Cholecalciferol (CALCIUM CARBONATE-VITAMIN D3 PO) Take by mouth    clopidogrel (PLAVIX) 75 mg tablet TK 1 T PO D    DAILY MULTIPLE VITAMINS tablet Take by mouth    Droplet Pen Needles 32G X 4 MM MISC     ELIQUIS 5 MG 5 mg 2 (two) times a day     ezetimibe (ZETIA) 10 mg tablet Take 10 mg by mouth daily    Insulin Degludec Dois Minors) 100 UNIT/ML SOLN Inject 20 Units under the skin    JANUVIA 100 MG tablet TAKE 1 TABLET EVERY DAY    metFORMIN (GLUCOPHAGE-XR) 750 mg 24 hr tablet TAKE 1 TABLET TWICE DAILY    Semaglutide (OZEMPIC, 0 25 OR 0 5 MG/DOSE, SC) Inject under the skin once a week    sotalol (BETAPACE) 80 mg tablet Take 40 mg by mouth 2 (two) times a day      tamsulosin (FLOMAX) 0 4 mg TK ONE C PO QHS    [DISCONTINUED] atorvastatin (LIPITOR) 40 mg tablet Take 1 tablet (40 mg total) by mouth daily (Patient taking differently: Take 80 mg by mouth daily  )     No current facility-administered medications on file prior to visit  He has No Known Allergies       Review of Systems   Constitutional: Positive for activity change (  Because ofJaw pain with ambulation has decreased his activity level) and appetite change ( admits with hisMedication for diabetes some decrease in his appetite)  Negative for chills, diaphoresis, fatigue, fever and unexpected weight change  HENT: Negative  Eyes: Negative  Respiratory: Negative  Cardiovascular: Negative  Jaw claudication a with brisk movement   Gastrointestinal: Negative  Endocrine: Negative  Genitourinary: Negative  Musculoskeletal: Positive for arthralgias  Negative for back pain, gait problem, joint swelling, myalgias, neck pain and neck stiffness  Skin: Negative  Allergic/Immunologic: Negative  Neurological: Positive for numbness ( paresthesia in hisRight lower extremity knee down to his foot is undergoing workup and evaluation)  Negative for dizziness, tremors, seizures, syncope, facial asymmetry, speech difficulty, weakness, light-headedness and headaches  Hematological: Negative  Psychiatric/Behavioral: Negative  Objective:      /60   Pulse 62   Temp 97 8 °F (36 6 °C) (Skin)   Ht 6' (1 829 m)   Wt 94 3 kg (208 lb)   SpO2 97%   BMI 28 21 kg/m²          Physical Exam  Vitals and nursing note reviewed  Constitutional:       General: He is not in acute distress  Appearance: Normal appearance  He is not ill-appearing, toxic-appearing or diaphoretic  Comments: Pleasant, mildly overweight 69-year-old male who is awake alert in no acute distress and oriented x3   HENT:      Head: Normocephalic and atraumatic  Right Ear: Tympanic membrane, ear canal and external ear normal  There is no impacted cerumen  Left Ear: Tympanic membrane, ear canal and external ear normal  There is no impacted cerumen  Ears:      Comments: Bilateral hearing aids     Nose: Nose normal  No congestion or rhinorrhea  Mouth/Throat:      Mouth: Mucous membranes are moist       Pharynx: Oropharynx is clear  No oropharyngeal exudate or posterior oropharyngeal erythema  Eyes:      General: No scleral icterus  Right eye: No discharge  Left eye: No discharge  Extraocular Movements: Extraocular movements intact  Conjunctiva/sclera: Conjunctivae normal       Pupils: Pupils are equal, round, and reactive to light  Neck:      Vascular: No carotid bruit  Cardiovascular:      Rate and Rhythm: Normal rate  Rhythm irregular  Pulses: Normal pulses  Heart sounds: Normal heart sounds  No murmur heard  No friction rub  No gallop  Pulmonary:      Effort: Pulmonary effort is normal  No respiratory distress  Breath sounds: Normal breath sounds  No stridor  No wheezing, rhonchi or rales  Chest:      Chest wall: No tenderness  Abdominal:      General: Bowel sounds are normal  There is no distension  Palpations: Abdomen is soft  There is no mass  Tenderness: There is no abdominal tenderness  There is no right CVA tenderness, left CVA tenderness, guarding or rebound  Hernia: No hernia is present  Comments:  overweight   Musculoskeletal:         General: Deformity present  No swelling, tenderness or signs of injury  Normal range of motion  Cervical back: Normal range of motion and neck supple  No rigidity or tenderness  Right lower leg: No edema  Left lower leg: No edema  Comments: Arthritic changes as noted previously  Nothing acute   Lymphadenopathy:      Cervical: No cervical adenopathy  Skin:     General: Skin is warm and dry  Capillary Refill: Capillary refill takes less than 2 seconds  Coloration: Skin is not jaundiced or pale        Findings: No bruising, erythema, lesion or rash  Neurological:      Mental Status: He is alert and oriented to person, place, and time  Mental status is at baseline  Cranial Nerves: No cranial nerve deficit  Sensory: No sensory deficit  Motor: No weakness  Coordination: Coordination normal       Gait: Gait normal       Deep Tendon Reflexes: Reflexes normal    Psychiatric:         Mood and Affect: Mood normal          Behavior: Behavior normal          Thought Content: Thought content normal          Judgment: Judgment normal          BMI Counseling: Body mass index is 28 21 kg/m²  The BMI is above normal  Nutrition recommendations include reducing portion sizes, decreasing overall calorie intake, 3-5 servings of fruits/vegetables daily, consuming healthier snacks, moderation in carbohydrate intake, increasing intake of lean protein, reducing intake of saturated fat and trans fat and reducing intake of cholesterol  Exercise recommendations include exercising 3-5 times per week

## 2021-06-21 NOTE — ASSESSMENT & PLAN NOTE
Blood pressure is controlled  Patient continue present medication and surveillance  He continues also to have surveillance of renal function

## 2021-06-21 NOTE — ASSESSMENT & PLAN NOTE
A cardiologist continues to monitor his heart rate  Remains on oral anticoagulants including Eliquis and Plavix

## 2021-06-21 NOTE — ASSESSMENT & PLAN NOTE
Patient continues to follow-up the with his cardiologist in [place]  Because of jaw claudication with exertion he is undergoing repeat stress test and evaluation with a known history of coronary artery disease    They continue to monitor his cholesterol and blood pressure

## 2021-06-21 NOTE — ASSESSMENT & PLAN NOTE
Lab Results   Component Value Date    HGBA1C 6 0 06/11/2020     Continues to follow-up with endocrinologist, podiatrist and ophthalmologist   Edgardo Hwang have shown adequate control with present treatment    His only complaint is the cost of medication which continues to increase period

## 2021-06-21 NOTE — PATIENT INSTRUCTIONS
Heart Healthy Diet   AMBULATORY CARE:   A heart healthy diet  is an eating plan low in unhealthy fats and sodium (salt)  The plan is high in healthy fats and fiber  A heart healthy diet helps improve your cholesterol levels and lowers your risk for heart disease and stroke  A dietitian will teach you how to read and understand food labels  Heart healthy diet guidelines to follow:   · Choose foods that contain healthy fats  ? Unsaturated fats  include monounsaturated and polyunsaturated fats  Unsaturated fat is found in foods such as soybean, canola, olive, corn, and safflower oils  It is also found in soft tub margarine that is made with liquid vegetable oil  ? Omega-3 fat  is found in certain fish, such as salmon, tuna, and trout, and in walnuts and flaxseed  Eat fish high in omega-3 fats at least 2 times a week  · Get 20 to 30 grams of fiber each day  Fruits, vegetables, whole-grain foods, and legumes (cooked beans) are good sources of fiber  · Limit or do not have unhealthy fats  ? Cholesterol  is found in animal foods, such as eggs and lobster, and in dairy products made from whole milk  Limit cholesterol to less than 200 mg each day  ? Saturated fat  is found in meats, such as zapata and hamburger  It is also found in chicken or turkey skin, whole milk, and butter  Limit saturated fat to less than 7% of your total daily calories  ? Trans fat  is found in packaged foods, such as potato chips and cookies  It is also in hard margarine, some fried foods, and shortening  Do not eat foods that contain trans fats  · Limit sodium as directed  You may be told to limit sodium to 2,000 to 2,300 mg each day  Choose low-sodium or no-salt-added foods  Add little or no salt to food you prepare  Use herbs and spices in place of salt         Include the following in your heart healthy plan:  Ask your dietitian or healthcare provider how many servings to have from each of the following food groups:  · Grains:      ? Whole-wheat breads, cereals, and pastas, and brown rice    ? Low-fat, low-sodium crackers and chips    · Vegetables:      ? Broccoli, green beans, green peas, and spinach    ? Collards, kale, and lima beans    ? Carrots, sweet potatoes, tomatoes, and peppers    ? Canned vegetables with no salt added    · Fruits:      ? Bananas, peaches, pears, and pineapple    ? Grapes, raisins, and dates    ? Oranges, tangerines, grapefruit, orange juice, and grapefruit juice    ? Apricots, mangoes, melons, and papaya    ? Raspberries and strawberries    ? Canned fruit with no added sugar    · Low-fat dairy:      ? Nonfat (skim) milk, 1% milk, and low-fat almond, cashew, or soy milks fortified with calcium    ? Low-fat cheese, regular or frozen yogurt, and cottage cheese    · Meats and proteins:      ? Lean cuts of beef and pork (loin, leg, round), skinless chicken and turkey    ? Legumes, soy products, egg whites, or nuts    Limit or do not include the following in your heart healthy plan:   · Unhealthy fats and oils:      ? Whole or 2% milk, cream cheese, sour cream, or cheese    ? High-fat cuts of beef (T-bone steaks, ribs), chicken or turkey with skin, and organ meats such as liver    ? Butter, stick margarine, shortening, and cooking oils such as coconut or palm oil    · Foods and liquids high in sodium:      ? Packaged foods, such as frozen dinners, cookies, macaroni and cheese, and cereals with more than 300 mg of sodium per serving    ? Vegetables with added sodium, such as instant potatoes, vegetables with added sauces, or regular canned vegetables    ? Cured or smoked meats, such as hot dogs, zapata, and sausage    ? High-sodium ketchup, barbecue sauce, salad dressing, pickles, olives, soy sauce, or miso    · Foods and liquids high in sugar:      ? Candy, cake, cookies, pies, or doughnuts    ? Soft drinks (soda), sports drinks, or sweetened tea    ?  Canned or dry mixes for cakes, soups, sauces, or gravies    Other healthy heart guidelines:   · Do not smoke  Nicotine and other chemicals in cigarettes and cigars can cause lung and heart damage  Ask your healthcare provider for information if you currently smoke and need help to quit  E-cigarettes or smokeless tobacco still contain nicotine  Talk to your healthcare provider before you use these products  · Limit or do not drink alcohol as directed  Alcohol can damage your heart and raise your blood pressure  Your healthcare provider may give you specific daily and weekly limits  The general recommended limit is 1 drink a day for women 21 or older and for men 72 or older  Do not have more than 3 drinks in a day or 7 in a week  The recommended limit is 2 drinks a day for men 24to 59years of age  Do not have more than 4 drinks in a day or 14 in a week  A drink of alcohol is 12 ounces of beer, 5 ounces of wine, or 1½ ounces of liquor  · Exercise regularly  Exercise can help you maintain a healthy weight and improve your blood pressure and cholesterol levels  Regular exercise can also decrease your risk for heart problems  Ask your healthcare provider about the best exercise plan for you  Do not start an exercise program without asking your healthcare provider  Follow up with your doctor or cardiologist as directed:  Write down your questions so you remember to ask them during your visits  © Copyright [address] Information is for End User's use only and may not be sold, redistributed or otherwise used for commercial purposes  All illustrations and images included in CareNotes® are the copyrighted property of [employer] D [employer] M [employer]  or [address] [place], [place] [address]   The above information is an  only  It is not intended as medical advice for individual conditions or treatments  Talk to your doctor, nurse or pharmacist before following any medical regimen to see if it is safe and effective for you      Calorie Counting Diet   WHAT YOU NEED TO KNOW:   What is a calorie counting diet? It is a meal plan based on counting calories each day to reach a healthy body weight  You will need to eat fewer calories if you are trying to lose weight  Weight loss may decrease your risk for certain health problems or improve your health if you have health problems  Some of these health problems include heart disease, high blood pressure, and diabetes  What foods should I avoid? Your dietitian will tell you if you need to avoid certain foods based on your body weight and health condition  You may need to avoid high-fat foods if you are at risk for or have heart disease  You may need to eat fewer foods from the breads and starches food group if you have diabetes  How many calories are in foods? The following is a list of foods and drinks with the approximate number of calories in each  Check the food label to find the exact number of calories  A dietitian can tell you how many calories you should have from each food group each day  · Carbohydrate:      ? ½ of a 3-inch bagel, 1 slice of bread, or ½ of a hamburger bun or hot dog bun (80)    ? 1 (8-inch) flour tortilla or ½ cup of cooked rice (100)    ? 1 (6-inch) corn tortilla (80)    ? 1 (6-inch) pancake or 1 cup of bran flakes cereal (110)    ? ½ cup of cooked cereal (80)    ? ½ cup of cooked pasta (85)    ? 1 ounce of pretzels (100)    ? 3 cups of air-popped popcorn without butter or oil (80)    · Dairy:      ? 1 cup of skim or 1% milk (90)    ? 1 cup of 2% milk (120)    ? 1 cup of whole milk (160)    ? 1 cup of 2% chocolate milk (220)    ? 1 ounce of low-fat cheese with 3 grams of fat per ounce (70)    ? 1 ounce of cheddar cheese (114)    ? ½ cup of 1% fat cottage cheese (80)    ? 1 cup of plain or sugar-free, fat-free yogurt (90)    · Protein foods:      ? 3 ounces of fish (not breaded or fried) (95)    ? 3 ounces of breaded, fried fish (195)    ? ¾ cup of tuna canned in water (105)    ?  3 ounces of chicken breast without skin (105)    ? 1 fried chicken breast with skin (350)    ? ¼ cup of fat free egg substitute (40)    ? 1 large egg (75)    ? 3 ounces of lean beef or pork (165)    ? 3 ounces of fried pork chop or ham (185)    ? ½ cup of cooked dried beans, such as kidney, orellana, lentils, or navy (115)    ? 3 ounces of bologna or lunch meat (225)    ? 2 links of breakfast sausage (140)    · Vegetables:      ? ½ cup of sliced mushrooms (10)    ? 1 cup of salad greens, such as lettuce, spinach, or ha (15)    ? ½ cup of steamed asparagus (20)    ? ½ cup of cooked summer squash, zucchini squash, or green or wax beans (25)    ? 1 cup of broccoli or cauliflower florets, or 1 medium tomato (25)    ? 1 large raw carrot or ½ cup of cooked carrots (40)    ? ? of a medium cucumber or 1 stalk of celery (5)    ? 1 small baked potato (160)    ? 1 cup of breaded, fried vegetables (230)    · Fruit:      ? 1 (6-inch) banana (55)     ? ½ of a 4-inch grapefruit (55)    ? 15 grapes (60)    ? 1 medium orange or apple (70)    ? 1 large peach (65)    ? 1 cup of fresh pineapple chunks (75)    ? 1 cup of melon cubes (50)    ? 1¼ cups of whole strawberries (45)    ? ½ cup of fruit canned in juice (55)    ? ½ cup of fruit canned in heavy syrup (110)    ? ? cup of raisins (130)    ? ½ cup of unsweetened fruit juice (60)    ? ½ cup of grape, cranberry, or prune juice (90)    · Fat:      ? 10 peanuts or 2 teaspoons of peanut butter (55)    ? 2 tablespoons of avocado or 1 tablespoon of regular salad dressing (45)    ? 2 slices of zapata (90)    ? 1 teaspoon of oil, such as safflower, canola, corn, or olive oil (45)    ? 2 teaspoons of low-fat margarine, or 1 tablespoon of low-fat mayonnaise (50)    ? 1 teaspoon of regular margarine (40)    ? 1 tablespoon of regular mayonnaise (135)    ? 1 tablespoon of cream cheese or 2 tablespoons of low-fat cream cheese (45)    ?  2 tablespoons of vegetable shortening (215)    · Dessert and sweets:      ? 8 animal crackers or 5 vanilla wafers (80)    ? 1 frozen fruit juice bar (80)    ? ½ cup of ice milk or low-fat frozen yogurt (90)    ? ½ cup of sherbet or sorbet (125)    ? ½ cup of sugar-free pudding or custard (60)    ? ½ cup of ice cream (140)    ? ½ cup of pudding or custard (175)    ? 1 (2-inch) square chocolate brownie (185)    · Combination foods:      ? Bean burrito made with an 8-inch tortilla, without cheese (275)    ? Chicken breast sandwich with lettuce and tomato (325)    ? 1 cup of chicken noodle soup (60)    ? 1 beef taco (175)    ? Regular hamburger with lettuce and tomato (310)    ? Regular cheeseburger with lettuce and tomato (410)     ? ¼ of a 12-inch cheese pizza (280)    ? Fried fish sandwich with lettuce and tomato (425)    ? Hot dog and bun (275)    ? 1½ cups of macaroni and cheese (310)    ? Taco salad with a fried tortilla shell (870)    · Low-calorie foods:      ? 1 tablespoon of ketchup or 1 tablespoon of fat free sour cream (15)    ? 1 teaspoon of mustard (5)    ? ¼ cup of salsa (20)    ? 1 large dill pickle (15)    ? 1 tablespoon of fat free salad dressing (10)    ? 2 teaspoons of low-sugar, light jam or jelly, or 1 tablespoon of sugar-free syrup (15)    ? 1 sugar-free popsicle (15)    ? 1 cup of club soda, seltzer water, or diet soda (0)    CARE AGREEMENT:   You have the right to help plan your care  Discuss treatment options with your healthcare provider to decide what care you want to receive  You always have the right to refuse treatment  The above information is an  only  It is not intended as medical advice for individual conditions or treatments  Talk to your doctor, nurse or pharmacist before following any medical regimen to see if it is safe and effective for you  © Copyright [address] Information is for End User's use only and may not be sold, redistributed or otherwise used for commercial purposes   All illustrations and images included in [employer] are the copyrighted property of [employer]  or [address] [place], [place] [address] [address]

## 2021-06-21 NOTE — ASSESSMENT & PLAN NOTE
Patient will be due for repeat Medicare wellness visit when he returns the office in 6 months  Patient is up-to-date with all lab testing  Patient was told prior to his next visit if any questions or concerns to please call

## 2022-01-04 ENCOUNTER — TELEPHONE (OUTPATIENT)
Dept: INTERNAL MEDICINE CLINIC | Facility: CLINIC | Age: 77
End: 2022-01-04

## 2022-01-04 NOTE — TELEPHONE ENCOUNTER
Patients daughter called and her dad has an appointment tomorrow with you  She has some concerns and asked if you could please call her before her dads appointment at [phone]    Thank you

## 2022-01-05 ENCOUNTER — OFFICE VISIT (OUTPATIENT)
Dept: INTERNAL MEDICINE CLINIC | Facility: CLINIC | Age: 77
End: 2022-01-05
Payer: COMMERCIAL

## 2022-01-05 VITALS
TEMPERATURE: 97 F | DIASTOLIC BLOOD PRESSURE: 70 MMHG | HEART RATE: 69 BPM | WEIGHT: 208.4 LBS | SYSTOLIC BLOOD PRESSURE: 124 MMHG | BODY MASS INDEX: 28.23 KG/M2 | HEIGHT: 72 IN | RESPIRATION RATE: 16 BRPM | OXYGEN SATURATION: 99 %

## 2022-01-05 DIAGNOSIS — E11.9 TYPE 2 DIABETES MELLITUS WITHOUT COMPLICATION, WITHOUT LONG-TERM CURRENT USE OF INSULIN (HCC): ICD-10-CM

## 2022-01-05 DIAGNOSIS — E11.59 TYPE 2 DIABETES MELLITUS WITH OTHER CIRCULATORY COMPLICATION, WITHOUT LONG-TERM CURRENT USE OF INSULIN (HCC): ICD-10-CM

## 2022-01-05 DIAGNOSIS — S01.112A LACERATION OF LEFT EYEBROW, INITIAL ENCOUNTER: ICD-10-CM

## 2022-01-05 DIAGNOSIS — N18.30 TYPE 2 DIABETES MELLITUS WITH STAGE 3 CHRONIC KIDNEY DISEASE, WITH LONG-TERM CURRENT USE OF INSULIN, UNSPECIFIED WHETHER STAGE 3A OR 3B CKD (HCC): ICD-10-CM

## 2022-01-05 DIAGNOSIS — F01.50 VASCULAR DEMENTIA WITHOUT BEHAVIORAL DISTURBANCE (HCC): ICD-10-CM

## 2022-01-05 DIAGNOSIS — E78.01 FAMILIAL HYPERCHOLESTEROLEMIA: ICD-10-CM

## 2022-01-05 DIAGNOSIS — I25.5 ISCHEMIC CARDIOMYOPATHY: ICD-10-CM

## 2022-01-05 DIAGNOSIS — Z79.4 TYPE 2 DIABETES MELLITUS WITH STAGE 3 CHRONIC KIDNEY DISEASE, WITH LONG-TERM CURRENT USE OF INSULIN, UNSPECIFIED WHETHER STAGE 3A OR 3B CKD (HCC): ICD-10-CM

## 2022-01-05 DIAGNOSIS — Z11.59 NEED FOR HEPATITIS C SCREENING TEST: Primary | ICD-10-CM

## 2022-01-05 DIAGNOSIS — E66.3 OVERWEIGHT: ICD-10-CM

## 2022-01-05 DIAGNOSIS — E11.22 TYPE 2 DIABETES MELLITUS WITH STAGE 3 CHRONIC KIDNEY DISEASE, WITH LONG-TERM CURRENT USE OF INSULIN, UNSPECIFIED WHETHER STAGE 3A OR 3B CKD (HCC): ICD-10-CM

## 2022-01-05 DIAGNOSIS — I48.0 PAROXYSMAL ATRIAL FIBRILLATION (HCC): ICD-10-CM

## 2022-01-05 LAB — SL AMB POCT HEMOGLOBIN AIC: 6 (ref ?–6.5)

## 2022-01-05 PROCEDURE — 1160F RVW MEDS BY RX/DR IN RCRD: CPT | Performed by: INTERNAL MEDICINE

## 2022-01-05 PROCEDURE — 99215 OFFICE O/P EST HI 40 MIN: CPT | Performed by: INTERNAL MEDICINE

## 2022-01-05 PROCEDURE — 3288F FALL RISK ASSESSMENT DOCD: CPT | Performed by: INTERNAL MEDICINE

## 2022-01-05 PROCEDURE — 83036 HEMOGLOBIN GLYCOSYLATED A1C: CPT | Performed by: INTERNAL MEDICINE

## 2022-01-05 PROCEDURE — 1036F TOBACCO NON-USER: CPT | Performed by: INTERNAL MEDICINE

## 2022-01-05 PROCEDURE — 1100F PTFALLS ASSESS-DOCD GE2>/YR: CPT | Performed by: INTERNAL MEDICINE

## 2022-01-05 RX ORDER — METOPROLOL SUCCINATE 25 MG/1
TABLET, EXTENDED RELEASE ORAL
COMMUNITY
Start: 2021-12-31

## 2022-01-06 PROBLEM — F01.50 VASCULAR DEMENTIA WITHOUT BEHAVIORAL DISTURBANCE (HCC): Status: ACTIVE | Noted: 2022-01-06

## 2022-01-06 PROBLEM — I25.5 ISCHEMIC CARDIOMYOPATHY: Status: ACTIVE | Noted: 2022-01-06

## 2022-01-06 PROBLEM — E11.59 TYPE 2 DIABETES MELLITUS WITH OTHER CIRCULATORY COMPLICATION, WITHOUT LONG-TERM CURRENT USE OF INSULIN (HCC): Status: ACTIVE | Noted: 2022-01-06

## 2022-01-06 PROBLEM — S01.112A LACERATION OF LEFT EYEBROW: Status: ACTIVE | Noted: 2022-01-06

## 2022-01-06 NOTE — PROGRESS NOTES
Suture removal    Date/Time: 1/6/2022 7:28 AM  Performed by: Sally Chan DO  Authorized by: Sally Chan DO   Universal Protocol:  Consent: Verbal consent obtained  Risks and benefits: risks, benefits and alternatives were discussed  Consent given by: patient and spouse  Patient understanding: patient states understanding of the procedure being performed  Patient consent: the patient's understanding of the procedure matches consent given  Patient identity confirmed: verbally with patient        Patient location:  Clinic  Location:     Laterality:  Left    Location:  [facility] location:  Eyebrow    Eyebrow location:  L eyebrow  Procedure details: Tools used:  Suture removal kit    Wound appearance:  No sign(s) of infection, good wound healing and clean    Number of sutures removed:  6  Post-procedure details:     Post-removal:  No dressing applied    Patient tolerance of procedure: Tolerated well, no immediate complications    Complication (if applicable):  Very small amount of bleeding removal of suture medially the eyebrow the right    Controlled with pressure

## 2022-01-06 NOTE — PATIENT INSTRUCTIONS
Low Fat Diet   AMBULATORY CARE:   A low-fat diet  is an eating plan that is low in total fat, unhealthy fat, and cholesterol  You may need to follow a low-fat diet if you have trouble digesting or absorbing fat  You may also need to follow this diet if you have high cholesterol  You can also lower your cholesterol by increasing the amount of fiber in your diet  Soluble fiber is a type of fiber that helps to decrease cholesterol levels  Different types of fat in food:   · Limit unhealthy fats  A diet that is high in cholesterol, saturated fat, and trans fat may cause unhealthy cholesterol levels  Unhealthy cholesterol levels increase your risk of heart disease  ? Cholesterol:  Limit intake of cholesterol to less than 200 mg per day  Cholesterol is found in meat, eggs, and dairy  ? Saturated fat:  Limit saturated fat to less than 7% of your total daily calories  Ask your dietitian how many calories you need each day  Saturated fat is found in butter, cheese, ice cream, whole milk, and palm oil  Saturated fat is also found in meat, such as beef, pork, chicken skin, and processed meats  Processed meats include sausage, hot dogs, and bologna  ? Trans fat:  Avoid trans fat as much as possible  Trans fat is used in fried and baked foods  Foods that say trans fat free on the label may still have up to 0 5 grams of trans fat per serving  · Include healthy fats  Replace foods that are high in saturated and trans fat with foods high in healthy fats  This may help to decrease high cholesterol levels  ? Monounsaturated fats: These are found in avocados, nuts, and vegetable oils, such as olive, canola, and sunflower oil  ? Polyunsaturated fats: These can be found in vegetable oils, such as soybean or corn oil  Omega-3 fats can help to decrease the risk of heart disease  Omega-3 fats are found in fish, such as salmon, herring, trout, and tuna   Omega-3 fats can also be found in plant foods, such as walnuts, flaxseed, soybeans, and canola oil  Foods to limit or avoid:   · Grains:      ? Snacks that are made with partially hydrogenated oils, such as chips, regular crackers, and butter-flavored popcorn    ? High-fat baked goods, such as biscuits, croissants, doughnuts, pies, cookies, and pastries    · Dairy:      ? Whole milk, 2% milk, and yogurt and ice cream made with whole milk    ? Half and half creamer, heavy cream, and whipping cream    ? Cheese, cream cheese, and sour cream    · Meats and proteins:      ? High-fat cuts of meat (T-bone steak, regular hamburger, and ribs)    ? Fried meat, poultry (turkey and chicken), and fish    ? Poultry (chicken and turkey) with skin    ? Cold cuts (salami or bologna), hot dogs, zapata, and sausage    ? Whole eggs and egg yolks    · Vegetables and fruits with added fat:      ? Fried vegetables or vegetables in butter or high-fat sauces, such as cream or cheese sauces    ? Fried fruit or fruit served with butter or cream    · Fats:      ? Butter, stick margarine, and shortening    ? Coconut, palm oil, and palm kernel oil    Foods to include:   · Grains:      ? Whole-grain breads, cereals, pasta, and brown rice    ? Low-fat crackers and pretzels    · Vegetables and fruits:      ? Fresh, frozen, or canned vegetables (no salt or low-sodium)    ? Fresh, frozen, dried, or canned fruit (canned in light syrup or fruit juice)    ? Avocado    · Low-fat dairy products:      ? Nonfat (skim) or 1% milk    ? Nonfat or low-fat cheese, yogurt, and cottage cheese    · Meats and proteins:      ? Chicken or turkey with no skin    ? Baked or broiled fish    ? Lean beef and pork (loin, round, extra lean hamburger)    ? Beans and peas, unsalted nuts, soy products    ? Egg whites and substitutes    ? Seeds and nuts    · Fats:      ? Unsaturated oil, such as canola, olive, peanut, soybean, or sunflower oil    ? Soft or liquid margarine and vegetable oil spread    ?  Low-fat salad dressing    Other ways to decrease fat:   · Read food labels before you buy foods  Choose foods that have less than 30% of calories from fat  Choose low-fat or fat-free dairy products  Remember that fat free does not mean calorie free  These foods still contain calories, and too many calories can lead to weight gain  · Trim fat from meat and avoid fried food  Trim all visible fat from meat before you cook it  Remove the skin from poultry  Do not [place] meat, fish, or poultry  Bake, roast, boil, or broil these foods instead  Avoid fried foods  Eat a baked potato instead of Western [place] fries  Steam vegetables instead of sautéing them in butter  · Add less fat to foods  Use imitation zapata bits on salads and baked potatoes instead of regular zapata bits  Use fat-free or low-fat salad dressings instead of regular dressings  Use low-fat or nonfat butter-flavored topping instead of regular butter or margarine on popcorn and other foods  Ways to decrease fat in recipes:  Replace high-fat ingredients with low-fat or nonfat ones  This may cause baked goods to be drier than usual  You may need to use nonfat cooking spray on pans to prevent food from sticking  You also may need to change the amount of other ingredients, such as water, in the recipe  Try the following:  · Use low-fat or light margarine instead of regular margarine or shortening  · Use lean ground turkey breast or chicken, or lean ground beef (less than 5% fat) instead of hamburger  · Add 1 teaspoon of canola oil to 8 ounces of skim milk instead of using cream or half and half  · Use grated zucchini, carrots, or apples in breads instead of coconut  · Use blenderized, low-fat cottage cheese, plain tofu, or low-fat ricotta cheese instead of cream cheese  · Use 1 egg white and 1 teaspoon of canola oil, or use ¼ cup (2 ounces) of fat-free egg substitute instead of a whole egg       · Replace half of the oil that is called for in a recipe with applesauce when you bake  Use 3 tablespoons of cocoa powder and 1 tablespoon of canola oil instead of a square of baking chocolate  How to increase fiber:  Eat enough high-fiber foods to get 20 to 30 grams of fiber every day  Slowly increase your fiber intake to avoid stomach cramps, gas, and other problems  · Eat 3 ounces of whole-grain foods each day  An ounce is about 1 slice of bread  Eat whole-grain breads, such as whole-wheat bread  Whole wheat, whole-wheat flour, or other whole grains should be listed as the first ingredient on the food label  Replace white flour with whole-grain flour or use half of each in recipes  Whole-grain flour is heavier than white flour, so you may have to add more yeast or baking powder  · Eat a high-fiber cereal for breakfast   Oatmeal is a good source of soluble fiber  Look for cereals that have bran or fiber in the name  Choose whole-grain products, such as brown rice, barley, and whole-wheat pasta  · Eat more beans, peas, and lentils  For example, add beans to soups or salads  Eat at least 5 cups of fruits and vegetables each day  Eat fruits and vegetables with the peel because the peel is high in fiber  © Copyright [employer] 2021 Information is for End User's use only and may not be sold, redistributed or otherwise used for commercial purposes  All illustrations and images included in CareNotes® are the copyrighted property of A D A M , Inc  or [address] [place], [place] [address]   The above information is an  only  It is not intended as medical advice for individual conditions or treatments  Talk to your doctor, nurse or pharmacist before following any medical regimen to see if it is safe and effective for you  Heart Healthy Diet   AMBULATORY CARE:   A heart healthy diet  is an eating plan low in unhealthy fats and sodium (salt)  The plan is high in healthy fats and fiber   A heart healthy diet helps improve your cholesterol levels and lowers your risk for heart disease and stroke  A dietitian will teach you how to read and understand food labels  Heart healthy diet guidelines to follow:   · Choose foods that contain healthy fats  ? Unsaturated fats  include monounsaturated and polyunsaturated fats  Unsaturated fat is found in foods such as soybean, canola, olive, corn, and safflower oils  It is also found in soft tub margarine that is made with liquid vegetable oil  ? Omega-3 fat  is found in certain fish, such as salmon, tuna, and trout, and in walnuts and flaxseed  Eat fish high in omega-3 fats at least 2 times a week  · Get 20 to 30 grams of fiber each day  Fruits, vegetables, whole-grain foods, and legumes (cooked beans) are good sources of fiber  · Limit or do not have unhealthy fats  ? Cholesterol  is found in animal foods, such as eggs and lobster, and in dairy products made from whole milk  Limit cholesterol to less than 200 mg each day  ? Saturated fat  is found in meats, such as zapata and hamburger  It is also found in chicken or turkey skin, whole milk, and butter  Limit saturated fat to less than 7% of your total daily calories  ? Trans fat  is found in packaged foods, such as potato chips and cookies  It is also in hard margarine, some fried foods, and shortening  Do not eat foods that contain trans fats  · Limit sodium as directed  You may be told to limit sodium to 2,000 to 2,300 mg each day  Choose low-sodium or no-salt-added foods  Add little or no salt to food you prepare  Use herbs and spices in place of salt  Include the following in your heart healthy plan:  Ask your dietitian or healthcare provider how many servings to have from each of the following food groups:  · Grains:      ? Whole-wheat breads, cereals, and pastas, and brown rice    ? Low-fat, low-sodium crackers and chips    · Vegetables:      ? Broccoli, green beans, green peas, and spinach    ? Collards, kale, and lima beans    ?  Carrots, sweet potatoes, tomatoes, and peppers    ? Canned vegetables with no salt added    · Fruits:      ? Bananas, peaches, pears, and pineapple    ? Grapes, raisins, and dates    ? Oranges, tangerines, grapefruit, orange juice, and grapefruit juice    ? Apricots, mangoes, melons, and papaya    ? Raspberries and strawberries    ? Canned fruit with no added sugar    · Low-fat dairy:      ? Nonfat (skim) milk, 1% milk, and low-fat almond, cashew, or soy milks fortified with calcium    ? Low-fat cheese, regular or frozen yogurt, and cottage cheese    · Meats and proteins:      ? Lean cuts of beef and pork (loin, leg, round), skinless chicken and turkey    ? Legumes, soy products, egg whites, or nuts    Limit or do not include the following in your heart healthy plan:   · Unhealthy fats and oils:      ? Whole or 2% milk, cream cheese, sour cream, or cheese    ? High-fat cuts of beef (T-bone steaks, ribs), chicken or turkey with skin, and organ meats such as liver    ? Butter, stick margarine, shortening, and cooking oils such as coconut or palm oil    · Foods and liquids high in sodium:      ? Packaged foods, such as frozen dinners, cookies, macaroni and cheese, and cereals with more than 300 mg of sodium per serving    ? Vegetables with added sodium, such as instant potatoes, vegetables with added sauces, or regular canned vegetables    ? Cured or smoked meats, such as hot dogs, zapata, and sausage    ? High-sodium ketchup, barbecue sauce, salad dressing, pickles, olives, soy sauce, or miso    · Foods and liquids high in sugar:      ? Candy, cake, cookies, pies, or doughnuts    ? Soft drinks (soda), sports drinks, or sweetened tea    ? Canned or dry mixes for cakes, soups, sauces, or gravies    Other healthy heart guidelines:   · Do not smoke  Nicotine and other chemicals in cigarettes and cigars can cause lung and heart damage  Ask your healthcare provider for information if you currently smoke and need help to quit  E-cigarettes or smokeless tobacco still contain nicotine  Talk to your healthcare provider before you use these products  · Limit or do not drink alcohol as directed  Alcohol can damage your heart and raise your blood pressure  Your healthcare provider may give you specific daily and weekly limits  The general recommended limit is 1 drink a day for women 21 or older and for men 72 or older  Do not have more than 3 drinks in a day or 7 in a week  The recommended limit is 2 drinks a day for men 24to 59years of age  Do not have more than 4 drinks in a day or 14 in a week  A drink of alcohol is 12 ounces of beer, 5 ounces of wine, or 1½ ounces of liquor  · Exercise regularly  Exercise can help you maintain a healthy weight and improve your blood pressure and cholesterol levels  Regular exercise can also decrease your risk for heart problems  Ask your healthcare provider about the best exercise plan for you  Do not start an exercise program without asking your healthcare provider  Follow up with your doctor or cardiologist as directed:  Write down your questions so you remember to ask them during your visits  © Copyright [employer] 2021 Information is for End User's use only and may not be sold, redistributed or otherwise used for commercial purposes  All illustrations and images included in CareNotes® are the copyrighted property of A D A M , Inc  or [facility]   The above information is an  only  It is not intended as medical advice for individual conditions or treatments  Talk to your doctor, nurse or pharmacist before following any medical regimen to see if it is safe and effective for you  Calorie Counting Diet   WHAT YOU NEED TO KNOW:   What is a calorie counting diet? It is a meal plan based on counting calories each day to reach a healthy body weight  You will need to eat fewer calories if you are trying to lose weight   Weight loss may decrease your risk for certain health problems or improve your health if you have health problems  Some of these health problems include heart disease, high blood pressure, and diabetes  What foods should I avoid? Your dietitian will tell you if you need to avoid certain foods based on your body weight and health condition  You may need to avoid high-fat foods if you are at risk for or have heart disease  You may need to eat fewer foods from the breads and starches food group if you have diabetes  How many calories are in foods? The following is a list of foods and drinks with the approximate number of calories in each  Check the food label to find the exact number of calories  A dietitian can tell you how many calories you should have from each food group each day  · Carbohydrate:      ? ½ of a 3-inch bagel, 1 slice of bread, or ½ of a hamburger bun or hot dog bun (80)    ? 1 (8-inch) flour tortilla or ½ cup of cooked rice (100)    ? 1 (6-inch) corn tortilla (80)    ? 1 (6-inch) pancake or 1 cup of bran flakes cereal (110)    ? ½ cup of cooked cereal (80)    ? ½ cup of cooked pasta (85)    ? 1 ounce of pretzels (100)    ? 3 cups of air-popped popcorn without butter or oil (80)    · Dairy:      ? 1 cup of skim or 1% milk (90)    ? 1 cup of 2% milk (120)    ? 1 cup of whole milk (160)    ? 1 cup of 2% chocolate milk (220)    ? 1 ounce of low-fat cheese with 3 grams of fat per ounce (70)    ? 1 ounce of cheddar cheese (114)    ? ½ cup of 1% fat cottage cheese (80)    ? 1 cup of plain or sugar-free, fat-free yogurt (90)    · Protein foods:      ? 3 ounces of fish (not breaded or fried) (95)    ? 3 ounces of breaded, fried fish (195)    ? ¾ cup of tuna canned in water (105)    ? 3 ounces of chicken breast without skin (105)    ? 1 fried chicken breast with skin (350)    ? ¼ cup of fat free egg substitute (40)    ? 1 large egg (75)    ? 3 ounces of lean beef or pork (165)    ? 3 ounces of fried pork chop or ham (185)    ?  ½ cup of cooked dried beans, such as kidney, orellana, lentils, or navy (115)    ? 3 ounces of bologna or lunch meat (225)    ? 2 links of breakfast sausage (140)    · Vegetables:      ? ½ cup of sliced mushrooms (10)    ? 1 cup of salad greens, such as lettuce, spinach, or ha (15)    ? ½ cup of steamed asparagus (20)    ? ½ cup of cooked summer squash, zucchini squash, or green or wax beans (25)    ? 1 cup of broccoli or cauliflower florets, or 1 medium tomato (25)    ? 1 large raw carrot or ½ cup of cooked carrots (40)    ? ? of a medium cucumber or 1 stalk of celery (5)    ? 1 small baked potato (160)    ? 1 cup of breaded, fried vegetables (230)    · Fruit:      ? 1 (6-inch) banana (55)     ? ½ of a 4-inch grapefruit (55)    ? 15 grapes (60)    ? 1 medium orange or apple (70)    ? 1 large peach (65)    ? 1 cup of fresh pineapple chunks (75)    ? 1 cup of melon cubes (50)    ? 1¼ cups of whole strawberries (45)    ? ½ cup of fruit canned in juice (55)    ? ½ cup of fruit canned in heavy syrup (110)    ? ? cup of raisins (130)    ? ½ cup of unsweetened fruit juice (60)    ? ½ cup of grape, cranberry, or prune juice (90)    · Fat:      ? 10 peanuts or 2 teaspoons of peanut butter (55)    ? 2 tablespoons of avocado or 1 tablespoon of regular salad dressing (45)    ? 2 slices of zapata (90)    ? 1 teaspoon of oil, such as safflower, canola, corn, or olive oil (45)    ? 2 teaspoons of low-fat margarine, or 1 tablespoon of low-fat mayonnaise (50)    ? 1 teaspoon of regular margarine (40)    ? 1 tablespoon of regular mayonnaise (135)    ? 1 tablespoon of cream cheese or 2 tablespoons of low-fat cream cheese (45)    ? 2 tablespoons of vegetable shortening (215)    · Dessert and sweets:      ? 8 animal crackers or 5 vanilla wafers (80)    ? 1 frozen fruit juice bar (80)    ? ½ cup of ice milk or low-fat frozen yogurt (90)    ? ½ cup of sherbet or sorbet (125)    ? ½ cup of sugar-free pudding or custard (60)    ?  ½ cup of ice cream (140)    ? ½ cup of pudding or custard (175)    ? 1 (2-inch) square chocolate brownie (185)    · Combination foods:      ? Bean burrito made with an 8-inch tortilla, without cheese (275)    ? Chicken breast sandwich with lettuce and tomato (325)    ? 1 cup of chicken noodle soup (60)    ? 1 beef taco (175)    ? Regular hamburger with lettuce and tomato (310)    ? Regular cheeseburger with lettuce and tomato (410)     ? ¼ of a 12-inch cheese pizza (280)    ? Fried fish sandwich with lettuce and tomato (425)    ? Hot dog and bun (275)    ? 1½ cups of macaroni and cheese (310)    ? Taco salad with a fried tortilla shell (870)    · Low-calorie foods:      ? 1 tablespoon of ketchup or 1 tablespoon of fat free sour cream (15)    ? 1 teaspoon of mustard (5)    ? ¼ cup of salsa (20)    ? 1 large dill pickle (15)    ? 1 tablespoon of fat free salad dressing (10)    ? 2 teaspoons of low-sugar, light jam or jelly, or 1 tablespoon of sugar-free syrup (15)    ? 1 sugar-free popsicle (15)    ? 1 cup of club soda, seltzer water, or diet soda (0)    CARE AGREEMENT:   You have the right to help plan your care  Discuss treatment options with your healthcare provider to decide what care you want to receive  You always have the right to refuse treatment  The above information is an  only  It is not intended as medical advice for individual conditions or treatments  Talk to your doctor, nurse or pharmacist before following any medical regimen to see if it is safe and effective for you  © Copyright [employer] 2021 Information is for End User's use only and may not be sold, redistributed or otherwise used for commercial purposes   All illustrations and images included in CareNotes® are the copyrighted property of A D A M , Inc  or [facility] [employer]

## 2022-01-06 NOTE — ASSESSMENT & PLAN NOTE
Lab Results   Component Value Date    HGBA1C 6 0 01/05/2022   A longstanding history of diabetes mellitus type 2  He continues to follow-up with his endocrinologist   Recent hemoglobin A1c shows good control at 6 0  Patient admits that he is not always compliant with his diet especially recently during the holidays  He will continue present medication and surveillance and no changes were made during his recent hospitalization
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
## 2022-01-06 NOTE — PROGRESS NOTES
Assessment/Plan:    Ischemic cardiomyopathy  Patient is a 68year old male history of longstanding coronary artery disease  Patient is being seen today acutely, transition of care visit after recent fall during the holidays visiting his family in [place]  Apparently leaving from the air bored had a fall and sustained facial injury laceration above his left eye her [employer] Ng placement of sutures, some damage to the bridge of his nose  Patient was admitted to the hospital for full evaluation as to the etiology of his syncope  Found to have ischemic changes to the heart probable new onset coronary artery disease  Echocardiogram was performed showing decreased ejection fraction which was of significance  Patient was further evaluated for possible bypass surgery was felt to be because of his underlying disease not a candidate  Because of his ischemic cardiomyopathy and decreased ejection fraction patient did have implantation of a defibrillator  Patient is here today for follow-up  He is accompanied by his wife and his daughter  States that he has feeling relatively well has had no further episodes of syncope since his hospitalization  Patient apparently was having some problems prior to his accident injury with some fatigue and feeling out of sorts which may be an indication he was having an ischemic event prior to the accident  Patient on evaluation today is awake alert  Does have laceration above his left eye with sutures in placed which were removed today and did have some bleeding but this was very mild and stopped with pressure  Patient has some ecchymosis below both eyes, defibrillator was in place with some ecchymosis to the skin surface of his left breast but no tenderness  Patient does have a follow-up visit to be seen by his local cardiologist tomorrow for further evaluation    I did discuss with the patient the importance of him not driving because of his acute ischemic event and cardiac complications from this  Hopefully patient understands and agrees and will have further evaluation again tomorrow by his cardiologist most likely will agree  Patient on examination showed a regular rate and rhythm with his heart  No ectopy was heard on auscultation  Again has some bruising as noted but no tenderness  Patient will continue with cardiac workup and evaluation as per his cardiologist tomorrow  Type 2 diabetes mellitus with other circulatory complication, without long-term current use of insulin ([address] )    Lab Results   Component Value Date    HGBA1C 6 0 01/05/2022   A longstanding history of diabetes mellitus type 2  He continues to follow-up with his endocrinologist   Recent hemoglobin A1c shows good control at 6 0  Patient admits that he is not always compliant with his diet especially recently during the holidays  He will continue present medication and surveillance and no changes were made during his recent hospitalization  Atrial fibrillation (HCC)  Chronic atrial fibrillation  Heart rate is controlled today and no ectopy was auscultated on evaluation  Again thoughts were that patient's heart rate may have been uncontrolled during his syncopal episode but most likely he was having an acute ischemic event and development now of ischemic cardiomyopathy  Remains on oral anticoagulants  Patient has had significant bruising from accident and injury facial region above and below both eyes and above his left eye with large laceration  Again patient does have a follow-up visit to be seen by Cardiology tomorrow for further evaluation workup modification of treatment  No meds were changed    Hyperlipidemia  History of hyperlipidemia  His cholesterol has been controlled with present medication    Patient is not always compliant with his dietary intake of fats and cholesterol and we discussed again the importance of watching his diet closely to prevent further ischemia along with control of his diabetes  Laceration of left eyebrow  Patient with his dramatic fall did have large laceration above his left eyebrow approximately 9 days ago  Patient did have subcutaneous and superficial sutures in place  Patient on evaluation did have a suture removal x6  Tolerated procedure well only minimal bleeding postprocedure  Patient has no evidence of infection  Continue with secondary healing    Overweight  With the patient's BMI he is considered obese  He is not compliant either with diet or exercise program and we did reinforce especially the new cardiac ischemia this is extremely important  Will continue to monitor his weight blood pressure and cholesterol, diabetes       Diagnoses and all orders for this visit:    Need for hepatitis C screening test  -     Hepatitis C Antibody ([facility]); Future    Type 2 diabetes mellitus without complication, without long-term current use of insulin (HCC)  -     Microalbumin / creatinine urine ratio ([facility]); Future  -     Ambulatory Referral to Ophthalmology; Future  -     POCT hemoglobin A1c    Vascular dementia without behavioral disturbance (HCC)    Type 2 diabetes mellitus with other circulatory complication, without long-term current use of insulin (HCC)    Paroxysmal atrial fibrillation (HCC)    Type 2 diabetes mellitus with stage 3 chronic kidney disease, with long-term current use of insulin, unspecified whether stage 3a or 3b CKD ([facility] )    Ischemic cardiomyopathy    Familial hypercholesterolemia    Laceration of left eyebrow, initial encounter  -     Suture removal    Overweight    BMI 28 0-28 9,adult    Other orders  -     metoprolol succinate (TOPROL-XL) 25 mg 24 hr tablet          Subjective:      Patient ID: Yasir Murphy is a 64 y o  male  The patient is a 64-year-old male history of multiple medical problems as outlined previously who is here today for acute evaluation    Still recent hospitalization after syncopal episode while in the airport in [place]   The patient had a fall laceration above his left eye  Admitted to the hospital for evaluation of his syncope  Found to have acute ischemic changes and evidence of severe cardiomyopathy  Patient during hospitalization had a defibrillator placed  Does have follow-up with his cardiologist tomorrow  Here for suture removal besides evaluation      The following portions of the patient's history were reviewed and updated as appropriate:   He  has a past medical history of Afib ([facility] [address] ), History of colon resection, coronary angioplasty, and Right arm fracture (1989)  He   Patient Active Problem List    Diagnosis Date Noted    Vascular dementia without behavioral disturbance ([facility] ) 01/06/2022    Type 2 diabetes mellitus with other circulatory complication, without long-term current use of insulin ([place] [address] ) 01/06/2022    Ischemic cardiomyopathy 01/06/2022    Laceration of left eyebrow 01/06/2022    Medicare annual wellness visit, subsequent 09/12/2019    Overweight 05/23/2019    Acute cystitis without hematuria 12/13/2018    Atrial fibrillation ([place] [address] ) 12/20/2017    Hypertension 09/19/2014    Arteriosclerotic cardiovascular disease 09/20/2012    Type 2 diabetes mellitus with stage 3 chronic kidney disease, with long-term current use of insulin, unspecified whether stage 3a or 3b CKD ([place] [address] ) 09/20/2012    Hyperlipidemia 09/20/2012     He  has no past surgical history on file  His family history includes No Known Problems in his brother, father, maternal grandfather, maternal grandmother, mother, paternal grandfather, paternal grandmother, and sister  He  reports that he has quit smoking  He has never used smokeless tobacco  He reports that he does not drink alcohol  No history on file for drug use    Current Outpatient Medications   Medication Sig Dispense Refill    ACCU-CHEK ARVIND PLUS test strip USE EVERY DAY  TO CHECK BLOOD SUGAR 100 each 3    Accu-Chek Softclix Lancets lancets TEST BLOOD SUGAR EVERY DAY AS DIRECTED 100 each 0    atorvastatin (LIPITOR) 80 mg tablet       Calcium Carb-Cholecalciferol (CALCIUM CARBONATE-VITAMIN D3 PO) Take by mouth      clopidogrel (PLAVIX) 75 mg tablet TK 1 T PO D  1    DAILY MULTIPLE VITAMINS tablet Take by mouth      Droplet Pen Needles 32G X 4 MM MISC       ELIQUIS 5 MG 5 mg 2 (two) times a day       ezetimibe (ZETIA) 10 mg tablet Take 10 mg by mouth daily      Insulin Degludec (Tresiba) 100 UNIT/ML SOLN Inject 20 Units under the skin      JANUVIA 100 MG tablet TAKE 1 TABLET EVERY DAY 90 tablet 3    metFORMIN (GLUCOPHAGE-XR) 750 mg 24 hr tablet TAKE 1 TABLET TWICE DAILY 180 tablet 0    metoprolol succinate (TOPROL-XL) 25 mg 24 hr tablet       Semaglutide (OZEMPIC, 0 25 OR 0 5 MG/DOSE, SC) Inject under the skin once a week      sotalol (BETAPACE) 80 mg tablet Take 40 mg by mouth 2 (two) times a day        tamsulosin (FLOMAX) 0 4 mg TK ONE C PO QHS  0     No current facility-administered medications for this visit       Current Outpatient Medications on File Prior to Visit   Medication Sig    ACCU-CHEK ARVIND PLUS test strip USE EVERY DAY  TO CHECK BLOOD SUGAR    Accu-Chek Softclix Lancets lancets TEST BLOOD SUGAR EVERY DAY AS DIRECTED    atorvastatin (LIPITOR) 80 mg tablet     Calcium Carb-Cholecalciferol (CALCIUM CARBONATE-VITAMIN D3 PO) Take by mouth    clopidogrel (PLAVIX) 75 mg tablet TK 1 T PO D    DAILY MULTIPLE VITAMINS tablet Take by mouth    Droplet Pen Needles 32G X 4 MM MISC     ELIQUIS 5 MG 5 mg 2 (two) times a day     ezetimibe (ZETIA) 10 mg tablet Take 10 mg by mouth daily    Insulin Degludec [place]) 100 UNIT/ML SOLN Inject 20 Units under the skin    JANUVIA 100 MG tablet TAKE 1 TABLET EVERY DAY    metFORMIN (GLUCOPHAGE-XR) 750 mg 24 hr tablet TAKE 1 TABLET TWICE DAILY    metoprolol succinate (TOPROL-XL) 25 mg 24 hr tablet     Semaglutide (OZEMPIC, 0 25 OR 0 5 MG/DOSE, SC) Inject under the skin once a week    sotalol (BETAPACE) 80 mg tablet Take 40 mg by mouth 2 (two) times a day      tamsulosin (FLOMAX) 0 4 mg TK ONE C PO QHS     No current facility-administered medications on file prior to visit  He has No Known Allergies       Review of Systems   Constitutional: Positive for activity change (Patient is had some restriction in his activity level which was not forced by his family after the accidental injury)  Negative for appetite change, chills, diaphoresis, fatigue, fever and unexpected weight change  HENT: Negative  Eyes: Negative  Did have injury above his left eye, eyebrow on the left  Had placement of subcutaneous sutures and superficial sutures were removed today in the office  Patient did have studies to make sure there was no acute injury to to the brain  Studies were negative  No visual changes and continues to follow-up with ophthalmology   Respiratory: Negative  Cardiovascular: Negative  Gastrointestinal: Negative  Endocrine: Negative  Genitourinary: Negative  Musculoskeletal: Negative  Skin: Positive for wound ( laceration eye brow on the left)  Negative for color change, pallor and rash  Allergic/Immunologic: Negative  Neurological: Positive for syncope and light-headedness ( patient admits that he was having some lightheadedness a few days prior to his accident )  Negative for dizziness, tremors, seizures, facial asymmetry, speech difficulty, weakness, numbness and headaches  Hematological: Negative for adenopathy  Bruises/bleeds easily ( on oral anticoagulants)  Psychiatric/Behavioral: Negative  Objective:      /70   Pulse 69   Temp (!) 97 °F (36 1 °C)   Resp 16   Ht 6' (1 829 m)   Wt 94 5 kg (208 lb 6 4 oz)   SpO2 99%   BMI 28 26 kg/m²          Physical Exam  Vitals and nursing note reviewed  Constitutional:       General: He is not in acute distress  Appearance: He is not ill-appearing, toxic-appearing or diaphoretic        Comments: Pleasant overweight 58-year-old male who is awake alert no acute distress oriented x3  Accompanied by his wife and daughter   Brandyn Kang:      Head: Normocephalic  Comments: The patient has a facial injury large laceration above is left RI  Ecchymosis periorbital region bilaterally  Right Ear: Tympanic membrane, ear canal and external ear normal  There is no impacted cerumen  Left Ear: Tympanic membrane, ear canal and external ear normal  There is no impacted cerumen  Ears:      Comments: Hearing aids in place     Nose: Nose normal  No congestion or rhinorrhea  Mouth/Throat:      Mouth: Mucous membranes are moist       Pharynx: Oropharynx is clear  No oropharyngeal exudate or posterior oropharyngeal erythema  Eyes:      General: No scleral icterus  Right eye: No discharge  Left eye: No discharge  Extraocular Movements: Extraocular movements intact  Conjunctiva/sclera: Conjunctivae normal       Pupils: Pupils are equal, round, and reactive to light  Comments: Laceration to left eyebrow   Neck:      Vascular: No carotid bruit  Cardiovascular:      Rate and Rhythm: Normal rate and regular rhythm  Heart sounds: Normal heart sounds  No murmur heard  No friction rub  No gallop  Pulmonary:      Effort: Pulmonary effort is normal  No respiratory distress  Breath sounds: Normal breath sounds  No stridor  No wheezing, rhonchi or rales  Chest:      Chest wall: No tenderness  Abdominal:      General: Bowel sounds are normal  There is no distension  Palpations: Abdomen is soft  There is no mass  Tenderness: There is no abdominal tenderness  There is no right CVA tenderness, left CVA tenderness, guarding or rebound  Hernia: No hernia is present  Comments: Overweight   Musculoskeletal:         General: No swelling, tenderness, deformity or signs of injury  Cervical back: Normal range of motion and neck supple  No rigidity or tenderness  Right lower leg: No edema  Left lower leg: No edema  Lymphadenopathy:      Cervical: No cervical adenopathy  Skin:     Coloration: Skin is not jaundiced or pale  Findings: Lesion ( laceration above his left eye, hyper aisle  Sutures in place which was removed with just a small amount of bleeding medially which was controlled with pressure) present  No bruising, erythema or rash  Neurological:      General: No focal deficit present  Mental Status: He is alert and oriented to person, place, and time  Mental status is at baseline  Cranial Nerves: No cranial nerve deficit  Sensory: No sensory deficit  Motor: No weakness  Coordination: Coordination normal       Gait: Gait normal       Deep Tendon Reflexes: Reflexes abnormal (Absent patella and Achilles tendon reflexes bilaterally)  Psychiatric:         Mood and Affect: Mood normal          Behavior: Behavior normal          Thought Content: Thought content normal          Judgment: Judgment normal          BMI Counseling: Body mass index is 28 26 kg/m²  The BMI is above normal  Nutrition recommendations include reducing portion sizes, decreasing overall calorie intake, 3-5 servings of fruits/vegetables daily, consuming healthier snacks, moderation in carbohydrate intake, increasing intake of lean protein, reducing intake of saturated fat and trans fat and reducing intake of cholesterol

## 2022-01-06 NOTE — ASSESSMENT & PLAN NOTE
History of hyperlipidemia  His cholesterol has been controlled with present medication  Patient is not always compliant with his dietary intake of fats and cholesterol and we discussed again the importance of watching his diet closely to prevent further ischemia along with control of his diabetes

## 2022-01-06 NOTE — ASSESSMENT & PLAN NOTE
Patient with his dramatic fall did have large laceration above his left eyebrow approximately 9 days ago  Patient did have subcutaneous and superficial sutures in place  Patient on evaluation did have a suture removal x6  Tolerated procedure well only minimal bleeding postprocedure  Patient has no evidence of infection    Continue with secondary healing

## 2022-01-07 ENCOUNTER — TELEPHONE (OUTPATIENT)
Dept: INTERNAL MEDICINE CLINIC | Facility: CLINIC | Age: 77
End: 2022-01-07

## 2022-01-07 DIAGNOSIS — S01.112A LACERATION OF LEFT EYEBROW, INITIAL ENCOUNTER: Primary | ICD-10-CM

## 2022-01-07 NOTE — TELEPHONE ENCOUNTER
Patient's daughter, Jose Aníbal called   [place] had a visit Wed 1/5 for stitches removal  He now has head pain and the bump is larger   Is there any concern   Jose Aníbal can be reached at [phone]

## 2022-01-07 NOTE — ASSESSMENT & PLAN NOTE
Family called today states patient severe swelling the eyebrow immediately were sutures removed  Be going to urgent care facility

## 2022-07-12 ENCOUNTER — TELEPHONE (OUTPATIENT)
Dept: INTERNAL MEDICINE CLINIC | Facility: CLINIC | Age: 77
End: 2022-07-12

## 2022-07-12 NOTE — TELEPHONE ENCOUNTER
Vidya Macdonald called to give you an update on mick  She is asking if you can call her at [phone] in reference to his triple bypass/ valve replacement surgery  thankyou

## 2022-07-20 ENCOUNTER — OFFICE VISIT (OUTPATIENT)
Dept: INTERNAL MEDICINE CLINIC | Facility: CLINIC | Age: 77
End: 2022-07-20
Payer: COMMERCIAL

## 2022-07-20 VITALS
WEIGHT: 204 LBS | OXYGEN SATURATION: 98 % | DIASTOLIC BLOOD PRESSURE: 60 MMHG | TEMPERATURE: 97.3 F | HEIGHT: 72 IN | BODY MASS INDEX: 27.63 KG/M2 | HEART RATE: 69 BPM | SYSTOLIC BLOOD PRESSURE: 116 MMHG

## 2022-07-20 DIAGNOSIS — Z23 ENCOUNTER FOR IMMUNIZATION: ICD-10-CM

## 2022-07-20 DIAGNOSIS — I48.0 PAROXYSMAL ATRIAL FIBRILLATION (HCC): ICD-10-CM

## 2022-07-20 DIAGNOSIS — E11.59 TYPE 2 DIABETES MELLITUS WITH OTHER CIRCULATORY COMPLICATION, WITHOUT LONG-TERM CURRENT USE OF INSULIN (HCC): Primary | ICD-10-CM

## 2022-07-20 DIAGNOSIS — I25.5 ISCHEMIC CARDIOMYOPATHY: ICD-10-CM

## 2022-07-20 PROCEDURE — G0009 ADMIN PNEUMOCOCCAL VACCINE: HCPCS | Performed by: INTERNAL MEDICINE

## 2022-07-20 PROCEDURE — 99214 OFFICE O/P EST MOD 30 MIN: CPT | Performed by: INTERNAL MEDICINE

## 2022-07-20 PROCEDURE — 90677 PCV20 VACCINE IM: CPT | Performed by: INTERNAL MEDICINE

## 2022-07-20 PROCEDURE — 1160F RVW MEDS BY RX/DR IN RCRD: CPT | Performed by: INTERNAL MEDICINE

## 2022-07-20 RX ORDER — ACETAMINOPHEN 325 MG/1
975 TABLET ORAL
COMMUNITY
Start: 2022-07-03

## 2022-07-20 RX ORDER — SEMAGLUTIDE 1.34 MG/ML
INJECTION, SOLUTION SUBCUTANEOUS
COMMUNITY
Start: 2022-06-02

## 2022-07-20 RX ORDER — INSULIN GLARGINE 100 [IU]/ML
INJECTION, SOLUTION SUBCUTANEOUS
COMMUNITY
Start: 2022-07-12

## 2022-07-20 RX ORDER — AMIODARONE HYDROCHLORIDE 200 MG/1
TABLET ORAL
COMMUNITY
Start: 2022-07-12

## 2022-07-20 RX ORDER — BUMETANIDE 1 MG/1
TABLET ORAL
COMMUNITY
Start: 2022-07-12 | End: 2022-10-26

## 2022-07-20 RX ORDER — PANTOPRAZOLE SODIUM 40 MG/1
TABLET, DELAYED RELEASE ORAL
COMMUNITY
Start: 2022-07-12

## 2022-07-20 RX ORDER — CETIRIZINE HYDROCHLORIDE 10 MG/1
10 TABLET ORAL DAILY
COMMUNITY
Start: 2022-07-12

## 2022-07-20 RX ORDER — ASPIRIN 81 MG/1
81 TABLET ORAL DAILY
COMMUNITY

## 2022-07-20 NOTE — ASSESSMENT & PLAN NOTE
Patient continues to follow-up with endocrinology  As noted last sugar that we have recorded and hemoglobin A1c shows adequate control  He is continuing with his insulin dose and the only thing that we are missing as far as his diabetes is concerned is a urine for microalbumin and this will be performed and hopefully also will be reviewed by his nephrologist an upcoming appointment    Patient relates that he is trying to be better about his dietary intake and his wife is watching him closely but states it is sometimes very difficult  Lab Results   Component Value Date    HGBA1C 6 0 01/05/2022

## 2022-07-20 NOTE — ASSESSMENT & PLAN NOTE
Because of his ongoing cardiac issues patient was recently hospitalized and did undergo cardiac catheterization and subsequent triple bypass surgery and aortic valve replacement  Patient apparently has been doing relatively well post surgical procedure  Continues to follow-up with cardiologist   Because of some renal insufficiency a his being seen by Nephrology for evaluation  States his energy level is improving and he staying physically active  We did review all his medications and hospital records    With his history of ischemic cardiomyopathy he does have an implantable defibrillator a Curette Text: Prior to application of cantharidin the lesions were lightly pared with a curette. Medical Necessity Clause: This procedure was medically necessary because the lesions that were treated were: Curette Before Application?: No Consent: The patient's consent was obtained including but not limited to risks of crusting, scabbing, scarring, blistering, darker or lighter pigmentary change, recurrence, incomplete removal and infection. Medical Necessity Information: It is in your best interest to select a reason for this procedure from the list below. All of these items fulfill various CMS LCD requirements except the new and changing color options. Detail Level: Zone Total Number Of Lesions Treated: [place]io Post-Care Instructions: I reviewed with the patient in detail post-care instructions. The patient understands that the treated areas should be washed off 6 to 8 hours after application. Strength: [place]

## 2022-07-20 NOTE — ASSESSMENT & PLAN NOTE
History of atrial fibrillation  Patient's heart rate is controlled and remains on oral anticoagulants  Rhythm is regular today in the office  Will continue present surveillance by Cardiology

## 2022-07-20 NOTE — PROGRESS NOTES
Assessment/Plan:    Ischemic cardiomyopathy  Because of his ongoing cardiac issues patient was recently hospitalized and did undergo cardiac catheterization and subsequent triple bypass surgery and aortic valve replacement  Patient apparently has been doing relatively well post surgical procedure  Continues to follow-up with cardiologist   Because of some renal insufficiency a his being seen by Nephrology for evaluation  States his energy level is improving and he staying physically active  We did review all his medications and hospital records  With his history of ischemic cardiomyopathy he does have an implantable defibrillator a    Type 2 diabetes mellitus with other circulatory complication, without long-term current use of insulin [facility])  Patient continues to follow-up with endocrinology  As noted last sugar that we have recorded and hemoglobin A1c shows adequate control  He is continuing with his insulin dose and the only thing that we are missing as far as his diabetes is concerned is a urine for microalbumin and this will be performed and hopefully also will be reviewed by his nephrologist an upcoming appointment  Patient relates that he is trying to be better about his dietary intake and his wife is watching him closely but states it is sometimes very difficult  Lab Results   Component Value Date    HGBA1C 6 0 01/05/2022       Atrial fibrillation (HCC)  History of atrial fibrillation  Patient's heart rate is controlled and remains on oral anticoagulants  Rhythm is regular today in the office  Will continue present surveillance by Cardiology         Diagnoses and all orders for this visit:    Type 2 diabetes mellitus with other circulatory complication, without long-term current use of insulin (HCC)  -     Microalbumin / creatinine urine ratio    Ischemic cardiomyopathy    Encounter for immunization  -     Pneumococcal Conjugate Vaccine 20-valent (Pcv20)    Paroxysmal atrial fibrillation ([facility] )    Other orders  -     Ozempic, 1 MG/DOSE, 4 MG/3ML SOPN injection pen  -     pantoprazole (PROTONIX) 40 mg tablet  -     Lantus SoloStar 100 units/mL SOPN; INJECT 10 UNITS SUBCUTANEOUSLY THREE TIMES DAILY BEFORE MEALS  (Patient not taking: Reported on 7/20/2022)  -     cetirizine (ZyrTEC) 10 mg tablet; Take 10 mg by mouth daily  -     bumetanide (BUMEX) 1 mg tablet  -     amiodarone 200 mg tablet  -     acetaminophen (TYLENOL) 325 mg tablet; 975 mg  -     aspirin (ECOTRIN LOW STRENGTH) 81 mg EC tablet; Take 81 mg by mouth daily          Subjective:      Patient ID: Aissatou Mcnally is a 64 y o  male  A 73-year-old male history of medical problems as outlined previously who is here today for follow-up  Since his last visit patient was hospitalized underwent cardiac catheterization then triple bypass surgery and aortic valve replacement  Patient states that he is doing well is slowly getting back to his normal activity level  Denies chest pain or pressure no increasing shortness of breath  His appetite is improving  Postoperatively did have some minor complications but the seem to have been resolved  Is being seen by Nephrology in the future a for evaluation of his renal insufficiency history diabetes hypertension      The following portions of the patient's history were reviewed and updated as appropriate:   He  has a past medical history of Afib ([facility] ), History of colon resection, coronary angioplasty, and Right arm fracture (1989)    He   Patient Active Problem List    Diagnosis Date Noted    Vascular dementia without behavioral disturbance ([place] [address] ) 01/06/2022    Type 2 diabetes mellitus with other circulatory complication, without long-term current use of insulin ([place] [address] ) 01/06/2022    Ischemic cardiomyopathy 01/06/2022    Laceration of left eyebrow 01/06/2022    Medicare annual wellness visit, subsequent 09/12/2019    Overweight 05/23/2019    Acute cystitis without hematuria 12/13/2018    Atrial fibrillation ([facility] ) 12/20/2017    Hypertension 09/19/2014    Arteriosclerotic cardiovascular disease 09/20/2012    Type 2 diabetes mellitus with stage 3 chronic kidney disease, with long-term current use of insulin, unspecified whether stage 3a or 3b CKD ([place] [address] ) 09/20/2012    Hyperlipidemia 09/20/2012     He  has no past surgical history on file  His family history includes No Known Problems in his brother, father, maternal grandfather, maternal grandmother, mother, paternal grandfather, paternal grandmother, and sister  He  reports that he has quit smoking  He has never used smokeless tobacco  He reports that he does not drink alcohol  No history on file for drug use    Current Outpatient Medications   Medication Sig Dispense Refill    ACCU-CHEK ARVIND PLUS test strip USE EVERY DAY  TO CHECK BLOOD SUGAR 100 each 3    Accu-Chek Softclix Lancets lancets TEST BLOOD SUGAR EVERY DAY AS DIRECTED 100 each 0    acetaminophen (TYLENOL) 325 mg tablet 975 mg      amiodarone 200 mg tablet       aspirin (ECOTRIN LOW STRENGTH) 81 mg EC tablet Take 81 mg by mouth daily      atorvastatin (LIPITOR) 80 mg tablet       bumetanide (BUMEX) 1 mg tablet       Calcium Carb-Cholecalciferol (CALCIUM CARBONATE-VITAMIN D3 PO) Take by mouth      cetirizine (ZyrTEC) 10 mg tablet Take 10 mg by mouth daily      clopidogrel (PLAVIX) 75 mg tablet TK 1 T PO D  1    DAILY MULTIPLE VITAMINS tablet Take by mouth      Droplet Pen Needles 32G X 4 MM MISC       ELIQUIS 5 MG 5 mg 2 (two) times a day       ezetimibe (ZETIA) 10 mg tablet Take 10 mg by mouth daily      Insulin Degludec 100 UNIT/ML SOLN Inject 20 Units under the skin      metFORMIN (GLUCOPHAGE-XR) 750 mg 24 hr tablet TAKE 1 TABLET TWICE DAILY 180 tablet 0    metoprolol succinate (TOPROL-XL) 25 mg 24 hr tablet       Ozempic, 1 MG/DOSE, 4 MG/3ML SOPN injection pen       pantoprazole (PROTONIX) 40 mg tablet       sotalol (BETAPACE) 80 mg tablet Take 40 mg by mouth 2 (two) times a day        tamsulosin (FLOMAX) 0 4 mg TK ONE C PO QHS  0    JANUVIA 100 MG tablet TAKE 1 TABLET EVERY DAY (Patient not taking: Reported on 7/20/2022) 90 tablet 3    Lantus SoloStar 100 units/mL SOPN INJECT 10 UNITS SUBCUTANEOUSLY THREE TIMES DAILY BEFORE MEALS  (Patient not taking: Reported on 7/20/2022)       No current facility-administered medications for this visit  Current Outpatient Medications on File Prior to Visit   Medication Sig    ACCU-CHEK ARVIND PLUS test strip USE EVERY DAY  TO CHECK BLOOD SUGAR    Accu-Chek Softclix Lancets lancets TEST BLOOD SUGAR EVERY DAY AS DIRECTED    acetaminophen (TYLENOL) 325 mg tablet 975 mg    amiodarone 200 mg tablet     aspirin (ECOTRIN LOW STRENGTH) 81 mg EC tablet Take 81 mg by mouth daily    atorvastatin (LIPITOR) 80 mg tablet     bumetanide (BUMEX) 1 mg tablet     Calcium Carb-Cholecalciferol (CALCIUM CARBONATE-VITAMIN D3 PO) Take by mouth    cetirizine (ZyrTEC) 10 mg tablet Take 10 mg by mouth daily    clopidogrel (PLAVIX) 75 mg tablet TK 1 T PO D    DAILY MULTIPLE VITAMINS tablet Take by mouth    Droplet Pen Needles 32G X 4 MM MISC     ELIQUIS 5 MG 5 mg 2 (two) times a day     ezetimibe (ZETIA) 10 mg tablet Take 10 mg by mouth daily    Insulin Degludec 100 UNIT/ML SOLN Inject 20 Units under the skin    metFORMIN (GLUCOPHAGE-XR) 750 mg 24 hr tablet TAKE 1 TABLET TWICE DAILY    metoprolol succinate (TOPROL-XL) 25 mg 24 hr tablet     Ozempic, 1 MG/DOSE, 4 MG/3ML SOPN injection pen     pantoprazole (PROTONIX) 40 mg tablet     sotalol (BETAPACE) 80 mg tablet Take 40 mg by mouth 2 (two) times a day      tamsulosin (FLOMAX) 0 4 mg TK ONE C PO QHS    JANUVIA 100 MG tablet TAKE 1 TABLET EVERY DAY (Patient not taking: Reported on 7/20/2022)    Lantus SoloStar 100 units/mL SOPN INJECT 10 UNITS SUBCUTANEOUSLY THREE TIMES DAILY BEFORE MEALS   (Patient not taking: Reported on 7/20/2022)    [DISCONTINUED] Semaglutide (OZEMPIC, 0 25 OR 0 5 MG/DOSE, SC) Inject under the skin once a week     No current facility-administered medications on file prior to visit  He has No Known Allergies       Review of Systems   Constitutional: Positive for activity change (The slowly getting back to normal activity level without major complications), appetite change ( had decreased appetite postoperatively but he states this is improving) and fatigue ( the energy level is improving but not back to normal as of yet)  Negative for chills, diaphoresis, fever and unexpected weight change  HENT: Negative  Eyes: Negative  Respiratory: Negative  Cardiovascular: Positive for leg swelling ( postoperatively did have some slight swelling to the right lower extremity site of vein graft  )  Negative for chest pain and palpitations  Gastrointestinal: Negative  Endocrine: Negative  Genitourinary: Negative  Musculoskeletal: Positive for arthralgias  Negative for back pain, gait problem, joint swelling, myalgias, neck pain and neck stiffness  Is complaining of some arthralgias to the surgical site anterior chest wall   Skin: Negative  Allergic/Immunologic: Negative  Neurological: Negative  Hematological: Negative  Psychiatric/Behavioral: Negative  Objective:      /60   Pulse 69   Temp (!) 97 3 °F (36 3 °C)   Ht 6' (1 829 m)   Wt 92 5 kg (204 lb)   SpO2 98%   BMI 27 67 kg/m²          Physical Exam  Vitals and nursing note reviewed  Constitutional:       General: He is not in acute distress  Appearance: Normal appearance  He is not ill-appearing, toxic-appearing or diaphoretic  Comments: Pleasant 57-year-old male who is awake alert a in no acute distress and oriented x3  Accompanied by his wife for the appointment   HENT:      Head: Normocephalic and atraumatic  Right Ear: Tympanic membrane, ear canal and external ear normal  There is no impacted cerumen        Left Ear: Tympanic membrane, ear canal and external ear normal  There is no impacted cerumen  Nose: Nose normal  No congestion or rhinorrhea  Mouth/Throat:      Mouth: Mucous membranes are moist       Pharynx: Oropharynx is clear  No oropharyngeal exudate or posterior oropharyngeal erythema  Eyes:      General: No scleral icterus  Right eye: No discharge  Left eye: No discharge  Extraocular Movements: Extraocular movements intact  Conjunctiva/sclera: Conjunctivae normal       Pupils: Pupils are equal, round, and reactive to light  Cardiovascular:      Rate and Rhythm: Normal rate  Rhythm irregular  Heart sounds: Murmur heard  No friction rub  No gallop  Pulmonary:      Effort: Pulmonary effort is normal  No respiratory distress  Breath sounds: Normal breath sounds  No stridor  No wheezing, rhonchi or rales  Chest:      Chest wall: No tenderness  Abdominal:      General: Bowel sounds are normal  There is no distension  Palpations: Abdomen is soft  There is no mass  Tenderness: There is no abdominal tenderness  There is no right CVA tenderness, left CVA tenderness, guarding or rebound  Hernia: No hernia is present  Musculoskeletal:         General: No swelling, tenderness, deformity or signs of injury  Normal range of motion  Right lower leg: Edema (Trace edema to the right lower extremity) present  Left lower leg: No edema  Skin:     General: Skin is warm and dry  Coloration: Skin is not jaundiced or pale  Findings: Lesion ( appropriate healing to site of operative procedure chest wall anteriorly and also where vein graft from his left lower extremity a  No signs of infection) present  No bruising, erythema or rash  Neurological:      Mental Status: He is alert and oriented to person, place, and time  Mental status is at baseline  Cranial Nerves: No cranial nerve deficit  Sensory: No sensory deficit  Motor: No weakness        Coordination: Coordination normal       Gait: Gait normal       Deep Tendon Reflexes: Reflexes normal    Psychiatric:         Mood and Affect: Mood normal          Behavior: Behavior normal          Thought Content:  Thought content normal          Judgment: Judgment normal

## 2022-07-28 LAB
ALBUMIN/CREAT UR: 10 MG/G CREAT (ref 0–29)
CREAT UR-MCNC: 156.3 MG/DL
MICROALBUMIN UR-MCNC: 15.3 UG/ML

## 2022-10-12 PROBLEM — S01.112A LACERATION OF LEFT EYEBROW: Status: RESOLVED | Noted: 2022-01-06 | Resolved: 2022-10-12

## 2022-10-12 NOTE — PROGRESS NOTES
Diabetic Foot Exam    Patient's shoes and socks removed  Right Foot/Ankle   Right Foot Inspection  Skin Exam: skin normal and skin intact  No dry skin, no warmth, no callus, no erythema, no maceration, no abnormal color, no pre-ulcer, no ulcer and no callus  Toe Exam: ROM and strength within normal limits  Sensory   Monofilament testing: intact    Vascular  Capillary refills: elevated  The right DP pulse is 1+  The right PT pulse is 1+  Left Foot/Ankle  Left Foot Inspection  Skin Exam: skin normal and skin intact  No dry skin, no warmth, no erythema, no maceration, normal color, no pre-ulcer, no ulcer and no callus  Toe Exam: ROM and strength within normal limits  Sensory   Monofilament testing: intact    Vascular  Capillary refills: elevated  The left DP pulse is 1+  The left PT pulse is 1+       Assign Risk Category  No deformity present  No loss of protective sensation  No weak pulses  Risk: 0

## 2022-10-18 LAB
CREAT ?TM UR-SCNC: 192.4 UMOL/L
EXT MICROALBUMIN URINE RANDOM: 13.7
HBA1C MFR BLD HPLC: 6.5 %
MICROALBUMIN/CREAT UR: 7 MG/G{CREAT}

## 2022-10-25 ENCOUNTER — TELEPHONE (OUTPATIENT)
Dept: ADMINISTRATIVE | Facility: OTHER | Age: 77
End: 2022-10-25

## 2022-10-25 ENCOUNTER — OFFICE VISIT (OUTPATIENT)
Dept: INTERNAL MEDICINE CLINIC | Facility: CLINIC | Age: 77
End: 2022-10-25
Payer: COMMERCIAL

## 2022-10-25 VITALS
OXYGEN SATURATION: 97 % | BODY MASS INDEX: 27.9 KG/M2 | TEMPERATURE: 98.2 F | RESPIRATION RATE: 18 BRPM | HEIGHT: 72 IN | WEIGHT: 206 LBS | DIASTOLIC BLOOD PRESSURE: 74 MMHG | HEART RATE: 70 BPM | SYSTOLIC BLOOD PRESSURE: 130 MMHG

## 2022-10-25 DIAGNOSIS — Z79.4 TYPE 2 DIABETES MELLITUS WITH STAGE 3 CHRONIC KIDNEY DISEASE, WITH LONG-TERM CURRENT USE OF INSULIN, UNSPECIFIED WHETHER STAGE 3A OR 3B CKD (HCC): ICD-10-CM

## 2022-10-25 DIAGNOSIS — I10 PRIMARY HYPERTENSION: ICD-10-CM

## 2022-10-25 DIAGNOSIS — E78.01 FAMILIAL HYPERCHOLESTEROLEMIA: ICD-10-CM

## 2022-10-25 DIAGNOSIS — I25.5 ISCHEMIC CARDIOMYOPATHY: ICD-10-CM

## 2022-10-25 DIAGNOSIS — E11.22 TYPE 2 DIABETES MELLITUS WITH STAGE 3 CHRONIC KIDNEY DISEASE, WITH LONG-TERM CURRENT USE OF INSULIN, UNSPECIFIED WHETHER STAGE 3A OR 3B CKD (HCC): ICD-10-CM

## 2022-10-25 DIAGNOSIS — N18.30 TYPE 2 DIABETES MELLITUS WITH STAGE 3 CHRONIC KIDNEY DISEASE, WITH LONG-TERM CURRENT USE OF INSULIN, UNSPECIFIED WHETHER STAGE 3A OR 3B CKD (HCC): ICD-10-CM

## 2022-10-25 DIAGNOSIS — Z12.5 PROSTATE CANCER SCREENING: Primary | ICD-10-CM

## 2022-10-25 DIAGNOSIS — I48.0 PAROXYSMAL ATRIAL FIBRILLATION (HCC): ICD-10-CM

## 2022-10-25 DIAGNOSIS — Z00.00 MEDICARE ANNUAL WELLNESS VISIT, SUBSEQUENT: ICD-10-CM

## 2022-10-25 PROCEDURE — G0439 PPPS, SUBSEQ VISIT: HCPCS | Performed by: INTERNAL MEDICINE

## 2022-10-25 PROCEDURE — 99214 OFFICE O/P EST MOD 30 MIN: CPT | Performed by: INTERNAL MEDICINE

## 2022-10-25 NOTE — ASSESSMENT & PLAN NOTE
Patient remains under the care with his endocrinologist   As noted last hemoglobin A1c was performed in January and showed good control  Patient admits that he is not always perfect with his diet  Stressed again the importance of watching his intake of concentrated sweets and simple carbohydrates and working at weight loss    Continue to follow-up with his podiatrist and ophthalmologist  Lab Results   Component Value Date    HGBA1C 6 0 01/05/2022

## 2022-10-25 NOTE — ASSESSMENT & PLAN NOTE
Patient is a 70-year-old male history of extensive medical problems as outlined previously who is here today for repeat Medicare wellness visit  Patient continues to follow-up with this physicians info about his regarding his coronary artery disease diabetes  He continues to take medication as prescribed  Patient recently had been in [place] during the summer accompanied by his wife  Has just returned to the area  In reviewing his chart patient is up-to-date with all routine screening other than PSA which will be scheduled  He is up-to-date with all routine screening otherwise  Patient's blood sugars are showing adequate control with present treatment any continues to follow-up with endocrinology  Denies chest pain or pressure no increasing shortness of breath    He also continues to follow-up with his ophthalmologist and podiatrist

## 2022-10-25 NOTE — TELEPHONE ENCOUNTER
----- Message from Jani Rogers MA sent at 10/25/2022  9:48 AM EDT -----  Regarding: hemoglobin A1C  10/25/22 9:48 AM    Hello, our patient Buck Weaver has had Hemoglobin A1C  completed/performed  Please assist in updating the patient chart by labs   The date of service is10/18/2022    Thank you,  [facility]

## 2022-10-25 NOTE — ASSESSMENT & PLAN NOTE
Patient has a history of atrial fibrillation  In a normal sinus rhythm today  Remains on oral anticoagulants and continues to follow-up with cardiology    Heart rate is controlled

## 2022-10-25 NOTE — PROGRESS NOTES
Assessment and Plan:     Problem List Items Addressed This Visit    None          Preventive health issues were discussed with patient, and age appropriate screening tests were ordered as noted in patient's After Visit Summary  Personalized health advice and appropriate referrals for health education or preventive services given if needed, as noted in patient's After Visit Summary  History of Present Illness:     Patient presents for a Medicare Wellness Visit    HPI   Patient Care Team:  Neena Álvarez DO as PCP - General     Review of Systems:     Review of Systems     Problem List:     Patient Active Problem List   Diagnosis   • Arteriosclerotic cardiovascular disease   • Atrial fibrillation ([facility] )   • Type 2 diabetes mellitus with stage 3 chronic kidney disease, with long-term current use of insulin, unspecified whether stage 3a or 3b CKD ([facility] )   • Hyperlipidemia   • Hypertension   • Acute cystitis without hematuria   • Overweight   • Medicare annual wellness visit, subsequent   • Vascular dementia without behavioral disturbance ([facility] )   • Type 2 diabetes mellitus with other circulatory complication, without long-term current use of insulin ([place] [id number] )   • Ischemic cardiomyopathy      Past Medical and Surgical History:     Past Medical History:   Diagnosis Date   • Afib ([facility] )    • Arthritis 1987   • Chronic kidney disease 2022   • Coronary artery disease ? • Diabetes mellitus ([facility] ) 1990   • Diverticulitis of colon 1995   • History of colon resection    • HL (hearing loss) 2021   • Hx of coronary angioplasty    • Hypertension ? • Myocardial infarction [facility]) ? • Right arm fracture 1989   • Substance abuse ([facility] ) 1983     Past Surgical History:   Procedure Laterality Date   • CARDIAC SURGERY  ? • CARDIAC VALVE REPLACEMENT  06/2022   • COLON SURGERY  ? • CORONARY ARTERY BYPASS GRAFT  06/2022   • FRACTURE SURGERY  ? • VENTRICULOPERITONEAL SHUNT  ?       Family History:     Family History   Problem Relation Age of Onset   • No Known Problems Mother    • No Known Problems Father    • No Known Problems Sister    • No Known Problems Brother    • No Known Problems Maternal Grandmother    • No Known Problems Maternal Grandfather    • No Known Problems Paternal Grandmother    • No Known Problems Paternal Grandfather       Social History:     Social History     Socioeconomic History   • Marital status: /Civil Union     Spouse name: None   • Number of children: None   • Years of education: None   • Highest education level: None   Occupational History   • None   Tobacco Use   • Smoking status: Former Smoker     Packs/day: 3 00     Years: 35 00     Pack years: 105 00     Types: Cigarettes, Pipe, Cigars     Start date: 1953     Quit date: 1987     Years since quittin 9   • Smokeless tobacco: Never Used   Vaping Use   • Vaping Use: Never used   Substance and Sexual Activity   • Alcohol use: Not Currently     Comment: Not since    • Drug use: Never   • Sexual activity: Yes     Partners: Female     Birth control/protection: None   Other Topics Concern   • None   Social History Narrative   • None     Social Determinants of Health     Financial Resource Strain: Low Risk    • Difficulty of Paying Living Expenses: Not very hard   Food Insecurity: Not on file   Transportation Needs: No Transportation Needs   • Lack of Transportation (Medical): No   • Lack of Transportation (Non-Medical):  No   Physical Activity: Not on file   Stress: Not on file   Social Connections: Not on file   Intimate Partner Violence: Not on file   Housing Stability: Not on file      Medications and Allergies:     Current Outpatient Medications   Medication Sig Dispense Refill   • ACCU-CHEK ARVIND PLUS test strip USE EVERY DAY  TO CHECK BLOOD SUGAR 100 each 3   • Accu-Chek Softclix Lancets lancets TEST BLOOD SUGAR EVERY DAY AS DIRECTED 100 each 0   • aspirin (ECOTRIN LOW STRENGTH) 81 mg EC tablet Take 81 mg by mouth daily     • atorvastatin (LIPITOR) 80 mg tablet      • Calcium Carb-Cholecalciferol (CALCIUM CARBONATE-VITAMIN D3 PO) Take by mouth     • DAILY MULTIPLE VITAMINS tablet Take by mouth     • Droplet Pen Needles 32G X 4 MM MISC      • ELIQUIS 5 MG 5 mg 2 (two) times a day      • ezetimibe (ZETIA) 10 mg tablet Take 10 mg by mouth daily     • metoprolol succinate (TOPROL-XL) 25 mg 24 hr tablet      • Ozempic, 1 MG/DOSE, 4 MG/3ML SOPN injection pen      • tamsulosin (FLOMAX) 0 4 mg TK ONE C PO QHS  0   • acetaminophen (TYLENOL) 325 mg tablet 975 mg     • amiodarone 200 mg tablet      • bumetanide (BUMEX) 1 mg tablet      • cetirizine (ZyrTEC) 10 mg tablet Take 10 mg by mouth daily     • clopidogrel (PLAVIX) 75 mg tablet TK 1 T PO D  1   • Insulin Degludec 100 UNIT/ML SOLN Inject 20 Units under the skin     • JANUVIA 100 MG tablet TAKE 1 TABLET EVERY DAY 90 tablet 3   • Lantus SoloStar 100 units/mL SOPN INJECT 10 UNITS SUBCUTANEOUSLY THREE TIMES DAILY BEFORE MEALS  (Patient not taking: Reported on 7/20/2022)     • metFORMIN (GLUCOPHAGE-XR) 750 mg 24 hr tablet TAKE 1 TABLET TWICE DAILY 180 tablet 0   • pantoprazole (PROTONIX) 40 mg tablet      • sotalol (BETAPACE) 80 mg tablet Take 40 mg by mouth 2 (two) times a day         No current facility-administered medications for this visit       No Known Allergies   Immunizations:     Immunization History   Administered Date(s) Administered   • COVID-19 MODERNA VACC 0 5 ML IM 01/28/2021, 01/28/2021, 02/25/2021, 02/25/2021, 10/29/2021   • H1N1, All Formulations 01/08/2010   • INFLUENZA 10/01/2021   • Influenza Split High Dose Preservative Free IM 09/03/2013, 10/03/2014, 11/16/2015, 12/02/2016, 10/31/2017, 10/31/2017   • Influenza, high dose seasonal 0 7 mL 09/20/2018, 09/12/2019, 10/19/2020   • Influenza, seasonal, injectable 1945, 09/20/2012   • Pneumococcal Conjugate 13-Valent 12/02/2016   • Pneumococcal Conjugate Vaccine 20-valent (Pcv20), Polysace 07/20/2022      Health Maintenance:         Topic Date Due   • Hepatitis C Screening  Never done         Topic Date Due   • COVID-19 Vaccine (4 - Booster for Moderna series) 02/28/2022   • Influenza Vaccine (1) 09/01/2022      Medicare Screening Tests and Risk Assessments:     Mirella Villalobos is here for his Subsequent Wellness visit  Last Medicare Wellness visit information reviewed, patient interviewed and updates made to the record today  Health Risk Assessment:   Patient rates overall health as very good  Patient feels that their physical health rating is same  Patient is satisfied with their life  Eyesight was rated as same  Hearing was rated as same  Patient feels that their emotional and mental health rating is slightly worse  Patients states they are never, rarely angry  Patient states they are sometimes unusually tired/fatigued  Pain experienced in the last 7 days has been some  Patient's pain rating has been 3/10  Patient states that he has experienced no weight loss or gain in last 6 months  Depression Screening:   PHQ-2 Score: 0      Fall Risk Screening: In the past year, patient has experienced: history of falling in past year      Home Safety:  Patient does not have trouble with stairs inside or outside of their home  Patient has working smoke alarms and has working carbon monoxide detector  Home safety hazards include: none  Nutrition:   Current diet is Regular, Diabetic, Low Cholesterol, Low Saturated Fat, Low Carb, No Added Salt and Limited junk food  Medications:   Patient is currently taking over-the-counter supplements  OTC medications include: Aspirin, calcium & allergy pills  Patient is able to manage medications  Activities of Daily Living (ADLs)/Instrumental Activities of Daily Living (IADLs):   Walk and transfer into and out of bed and chair?: Yes  Dress and groom yourself?: Yes    Bathe or shower yourself?: Yes    Feed yourself?  Yes  Do your laundry/housekeeping?: Yes  Manage your money, pay your bills and track your expenses?: Yes  Make your own meals?: Yes    Do your own shopping?: Yes    Durable Medical Equipment Suppliers  None    Previous Hospitalizations:   Any hospitalizations or ED visits within the last 12 months?: Yes    How many hospitalizations have you had in the last year?: 1-2    Advance Care Planning:   Living will: Yes    Durable POA for healthcare: Yes    Advanced directive: Yes      PREVENTIVE SCREENINGS      Cardiovascular Screening:    General: Screening Not Indicated and History Lipid Disorder      Diabetes Screening:     General: Screening Not Indicated and History Diabetes      Prostate Cancer Screening:    General: Screening Not Indicated      Abdominal Aortic Aneurysm (AAA) Screening:    Risk factors include: tobacco use        Lung Cancer Screening:     General: Screening Not Indicated    Screening, Brief Intervention, and Referral to Treatment (SBIRT)    Screening  Typical number of drinks in a day: 0  Typical number of drinks in a week: 0  Interpretation: Low risk drinking behavior      AUDIT-C Screenin) How often did you have a drink containing alcohol in the past year? never  2) How many drinks did you have on a typical day when you were drinking in the past year? 0  3) How often did you have 6 or more drinks on one occasion in the past year? never    AUDIT-C Score: 0  Interpretation: Score 0-3 (male): Negative screen for alcohol misuse    Single Item Drug Screening:  How often have you used an illegal drug (including marijuana) or a prescription medication for non-medical reasons in the past year? never    Single Item Drug Screen Score: 0  Interpretation: Negative screen for possible drug use disorder    No exam data present     Physical Exam:     /74 (BP Location: Left arm, Patient Position: Sitting, Cuff Size: Adult)   Pulse 70   Temp 98 2 °F (36 8 °C) (Tympanic)   Resp 18   Ht 6' (1 829 m)   Wt 93 4 kg (206 lb)   SpO2 97%   BMI 27 94 kg/m²     Physical Exam     Louisa Tim DO

## 2022-10-25 NOTE — PROGRESS NOTES
Answers for HPI/ROS submitted by the patient on 10/24/2022  How would you rate your overall health?: very good  Compared to last year, how is your physical health?: same  In general, how satisfied are you with your life?: satisfied  Compared to last year, how is your eyesight?: same  Compared to last year, how is your hearing?: same  Compared to last year, how is your emotional/mental health?: slightly worse  How often is anger a problem for you?: never, rarely  How often do you feel unusually tired/fatigued?: sometimes  In the past 7 days, how much pain have you experienced?: some  If you answered "some" or "a lot", please rate the severity of your pain on a scale of 1 to 10 (1 being the least severe pain and 10 being the most intense pain)  : 3/10  In the past 6 months, have you lost or gained 10 pounds without trying?: No  One or more falls in the last year: Yes  Do you have trouble with the stairs inside or outside your home?: No  Does your home have working smoke alarms?: Yes  Does your home have a carbon monoxide monitor?: Yes  Which safety hazards (if any) have you experienced in your home? Please select all that apply : none  How would you describe your current diet?  Please select all that apply : Regular, Diabetic, Low Cholesterol, Low Saturated Fat, Low Carb, No Added Salt, Limited junk food  In addition to prescription medications, are you taking any over-the-counter supplements?: Yes  If yes, what supplements are you taking?: Aspirin, calcium & allergy pills  Can you manage your medications?: Yes  Are you currently taking any opioid medications?: No  Can you walk and transfer into and out of your bed and chair?: Yes  Can you dress and groom yourself?: Yes  Can you bathe or shower yourself?: Yes  Can you feed yourself?: Yes  Can you do your laundry/ housekeeping?: Yes  Can you manage your money, pay your bills, and track your expenses?: Yes  Can you make your own meals?: Yes  Can you do your own shopping?: Yes  Please list your DME (Durable Medical Equipment) supplier, if you use one : None  Within the last 12 months, have you had any hospitalizations or Emergency Department visits?: Yes  If yes, how many times have you been hospitalized within the past year?: 1-2  Do you have a living will?: Yes  Do you have a Durable POA ([address]) for healthcare decisions?: Yes  Do you have an Advanced Directive for end of life decisions?: Yes  How often have you used an illegal drug (including marijuana) or a prescription medication for non-medical reasons in the past year?: never  What is the typical number of drinks you consume in a day?: 0  What is the typical number of drinks you consume in a week?: 0  How often did you have a drink containing alcohol in the past year?: never  How many drinks did you have on a typical day  when you were drinking in the past year?: 0  How often did you have 6 or more drinks on one occasion in the past year?: never    Name: Ofelia Abbott      : 1945      MRN: [id number]  Encounter Provider: Art Moran DO  Encounter Date: 10/25/2022   Encounter department:  [address] [place] [address]     1  Prostate cancer screening  -     PSA, Total Screen; Future    2  Medicare annual wellness visit, subsequent  Assessment & Plan:  Patient is a 64-year-old male history of extensive medical problems as outlined previously who is here today for repeat Medicare wellness visit  Patient continues to follow-up with this physicians info about his regarding his coronary artery disease diabetes  He continues to take medication as prescribed  Patient recently had been in [place] during the summer accompanied by his wife  Has just returned to the area  In reviewing his chart patient is up-to-date with all routine screening other than PSA which will be scheduled  He is up-to-date with all routine screening otherwise    Patient's blood sugars are showing adequate control with present treatment any continues to follow-up with endocrinology  Denies chest pain or pressure no increasing shortness of breath  He also continues to follow-up with his ophthalmologist and podiatrist       3  Type 2 diabetes mellitus with stage 3 chronic kidney disease, with long-term current use of insulin, unspecified whether stage 3a or 3b CKD ([facility] [address] )  Assessment & Plan:  Patient remains under the care with his endocrinologist   As noted last hemoglobin A1c was performed in January and showed good control  Patient admits that he is not always perfect with his diet  Stressed again the importance of watching his intake of concentrated sweets and simple carbohydrates and working at weight loss  Continue to follow-up with his podiatrist and ophthalmologist  Lab Results   Component Value Date    HGBA1C 6 0 01/05/2022         4  Paroxysmal atrial fibrillation [facility])  Assessment & Plan:  Patient has a history of atrial fibrillation  In a normal sinus rhythm today  Remains on oral anticoagulants and continues to follow-up with cardiology  Heart rate is controlled      5  Ischemic cardiomyopathy  Assessment & Plan:  Continues to follow-up with cardiology  He has a history of ischemic cardiomyopathy secondary to his coronary artery disease  They continue to monitor his cardiac function      6  Primary hypertension  Assessment & Plan:  Patient has a history of hypertension  Blood pressure is controlled today  He continues to also follow up with Nephrology that follows his renal function  7  Familial hypercholesterolemia  Assessment & Plan:  History of hyperlipidemia  Remains on high dose of atorvastatin 80 mg a day  Patient admits that he is not always perfect with his diet and we stressed again the importance of watching his intake of fats and cholesterol with his diet               Subjective     Patient is a 22-year-old male history of extensive medical problems as outlined previously who is here today for repeat Medicare wellness visit  Patient did not have labs performed prior to the visit today but these will be ordered in the near future  Patient states in general doing relatively well physically and he continues to follow-up with his specialists including his cardiologist, endocrinologist, podiatrist, ophthalmologist, nephrologist   Patient has been spending some of the summer in [place] and is establishing care there with a local cardiologist along with his physicians outside of [place]    Review of Systems   Constitutional: Negative  HENT: Negative  Eyes: Negative  Respiratory: Negative  Cardiovascular: Negative  Gastrointestinal: Negative  Endocrine: Negative  Genitourinary: Negative  Musculoskeletal: Negative  Skin: Negative  Allergic/Immunologic: Negative  Neurological: Negative  Hematological: Negative  Psychiatric/Behavioral: Negative  Past Medical History:   Diagnosis Date   • Afib [facility])    • Arthritis 1987   • Chronic kidney disease 2022   • Coronary artery disease ? • Diabetes mellitus ([facility] ) 1990   • Diverticulitis of colon 1995   • History of colon resection    • HL (hearing loss) 2021   • Hx of coronary angioplasty    • Hypertension ? • Myocardial infarction [facility]) ? • Right arm fracture 1989   • Substance abuse ([facility] ) 1983     Past Surgical History:   Procedure Laterality Date   • CARDIAC SURGERY  ? • CARDIAC VALVE REPLACEMENT  06/2022   • COLON SURGERY  ? • CORONARY ARTERY BYPASS GRAFT  06/2022   • FRACTURE SURGERY  ? • VENTRICULOPERITONEAL SHUNT  ?      Family History   Problem Relation Age of Onset   • No Known Problems Mother    • No Known Problems Father    • No Known Problems Sister    • No Known Problems Brother    • No Known Problems Maternal Grandmother    • No Known Problems Maternal Grandfather    • No Known Problems Paternal Grandmother    • No Known Problems Paternal Grandfather      Social History Socioeconomic History   • Marital status: /Civil Union     Spouse name: None   • Number of children: None   • Years of education: None   • Highest education level: None   Occupational History   • None   Tobacco Use   • Smoking status: Former Smoker     Packs/day: 3 00     Years: 35 00     Pack years: 105 00     Types: Cigarettes, Pipe, Cigars     Start date: 1953     Quit date: 1987     Years since quittin 9   • Smokeless tobacco: Never Used   Vaping Use   • Vaping Use: Never used   Substance and Sexual Activity   • Alcohol use: Not Currently     Comment: Not since    • Drug use: Never   • Sexual activity: Yes     Partners: Female     Birth control/protection: None   Other Topics Concern   • None   Social History Narrative   • None     Social Determinants of Health     Financial Resource Strain: Low Risk    • Difficulty of Paying Living Expenses: Not very hard   Food Insecurity: Not on file   Transportation Needs: No Transportation Needs   • Lack of Transportation (Medical): No   • Lack of Transportation (Non-Medical):  No   Physical Activity: Not on file   Stress: Not on file   Social Connections: Not on file   Intimate Partner Violence: Not on file   Housing Stability: Not on file     Current Outpatient Medications on File Prior to Visit   Medication Sig   • ACCU-CHEK ARVIND PLUS test strip USE EVERY DAY  TO CHECK BLOOD SUGAR   • Accu-Chek Softclix Lancets lancets TEST BLOOD SUGAR EVERY DAY AS DIRECTED   • aspirin (ECOTRIN LOW STRENGTH) 81 mg EC tablet Take 81 mg by mouth daily   • atorvastatin (LIPITOR) 80 mg tablet    • Calcium Carb-Cholecalciferol (CALCIUM CARBONATE-VITAMIN D3 PO) Take by mouth   • DAILY MULTIPLE VITAMINS tablet Take by mouth   • Droplet Pen Needles 32G X 4 MM MISC    • ELIQUIS 5 MG 5 mg 2 (two) times a day    • ezetimibe (ZETIA) 10 mg tablet Take 10 mg by mouth daily   • metoprolol succinate (TOPROL-XL) 25 mg 24 hr tablet 50 mg   • Ozempic, 1 MG/DOSE, 4 MG/3ML SOPN injection pen    • tamsulosin (FLOMAX) 0 4 mg TK ONE C PO QHS   • acetaminophen (TYLENOL) 325 mg tablet 975 mg   • amiodarone 200 mg tablet    • bumetanide (BUMEX) 1 mg tablet    • cetirizine (ZyrTEC) 10 mg tablet Take 10 mg by mouth daily   • clopidogrel (PLAVIX) 75 mg tablet TK 1 T PO D   • Insulin Degludec 100 UNIT/ML SOLN Inject 20 Units under the skin   • JANUVIA 100 MG tablet TAKE 1 TABLET EVERY DAY   • Lantus SoloStar 100 units/mL SOPN INJECT 10 UNITS SUBCUTANEOUSLY THREE TIMES DAILY BEFORE MEALS  (Patient not taking: Reported on 7/20/2022)   • metFORMIN (GLUCOPHAGE-XR) 750 mg 24 hr tablet TAKE 1 TABLET TWICE DAILY   • pantoprazole (PROTONIX) 40 mg tablet    • sotalol (BETAPACE) 80 mg tablet Take 40 mg by mouth 2 (two) times a day       No Known Allergies  Immunization History   Administered Date(s) Administered   • COVID-19 MODERNA VACC 0 5 ML IM 01/28/2021, 01/28/2021, 02/25/2021, 02/25/2021, 10/29/2021   • H1N1, All Formulations 01/08/2010   • INFLUENZA 10/01/2021   • Influenza Split High Dose Preservative Free IM 09/03/2013, 10/03/2014, 11/16/2015, 12/02/2016, 10/31/2017, 10/31/2017   • Influenza, high dose seasonal 0 7 mL 09/20/2018, 09/12/2019, 10/19/2020   • Influenza, seasonal, injectable 1945, 09/20/2012   • Pneumococcal Conjugate 13-Valent 12/02/2016   • Pneumococcal Conjugate Vaccine 20-valent (Pcv20), Polysace 07/20/2022       Objective     /74 (BP Location: Left arm, Patient Position: Sitting, Cuff Size: Adult)   Pulse 70   Temp 98 2 °F (36 8 °C) (Tympanic)   Resp 18   Ht 6' (1 829 m)   Wt 93 4 kg (206 lb)   SpO2 97%   BMI 27 94 kg/m²     Physical Exam  Vitals and nursing note reviewed  Constitutional:       General: He is not in acute distress  Appearance: Normal appearance  He is not ill-appearing, toxic-appearing or diaphoretic  Comments: Pleasant, overweight 64-year-old male who is awake alert and oriented x3    Accompanied by his wife   HENT:      Head: Normocephalic  Right Ear: Tympanic membrane, ear canal and external ear normal  There is no impacted cerumen  Left Ear: Tympanic membrane, ear canal and external ear normal  There is no impacted cerumen  Nose: Nose normal  No congestion or rhinorrhea  Mouth/Throat:      Mouth: Mucous membranes are moist       Pharynx: Oropharynx is clear  No oropharyngeal exudate or posterior oropharyngeal erythema  Eyes:      General: No scleral icterus  Right eye: No discharge  Left eye: No discharge  Extraocular Movements: Extraocular movements intact  Conjunctiva/sclera: Conjunctivae normal       Pupils: Pupils are equal, round, and reactive to light  Neck:      Vascular: No carotid bruit  Cardiovascular:      Rate and Rhythm: Normal rate and regular rhythm  Heart sounds: Normal heart sounds  No murmur heard  No friction rub  No gallop  Comments: Faint pulses bilateral upper and lower extremity  Pulmonary:      Effort: Pulmonary effort is normal  No respiratory distress  Breath sounds: Normal breath sounds  No stridor  No wheezing, rhonchi or rales  Chest:      Chest wall: No tenderness  Abdominal:      General: Abdomen is flat  Bowel sounds are normal  There is no distension  Palpations: Abdomen is soft  There is no mass  Tenderness: There is no abdominal tenderness  There is no right CVA tenderness, left CVA tenderness, guarding or rebound  Hernia: No hernia is present  Genitourinary:     Comments: Patient deferred exam  Musculoskeletal:         General: Deformity (Generalized arthritic changes with nothing acute) present  No swelling, tenderness or signs of injury  Normal range of motion  Cervical back: Normal range of motion and neck supple  No rigidity or tenderness  Right lower leg: No edema  Left lower leg: No edema  Lymphadenopathy:      Cervical: No cervical adenopathy  Skin:     General: Skin is warm and dry  Coloration: Skin is not jaundiced or pale  Findings: No bruising, erythema, lesion or rash  Neurological:      General: No focal deficit present  Mental Status: He is alert and oriented to person, place, and time  Mental status is at baseline  Cranial Nerves: No cranial nerve deficit  Sensory: No sensory deficit  Motor: No weakness  Coordination: Coordination normal       Gait: Gait normal       Deep Tendon Reflexes: Reflexes normal    Psychiatric:         Mood and Affect: Mood normal          Thought Content:  Thought content normal          Judgment: Judgment normal        Nathalie Naqvi DO

## 2022-10-25 NOTE — ASSESSMENT & PLAN NOTE
Continues to follow-up with cardiology  He has a history of ischemic cardiomyopathy secondary to his coronary artery disease    They continue to monitor his cardiac function

## 2022-10-25 NOTE — ASSESSMENT & PLAN NOTE
Patient has a history of hypertension  Blood pressure is controlled today  He continues to also follow up with Nephrology that follows his renal function

## 2022-10-25 NOTE — ASSESSMENT & PLAN NOTE
History of hyperlipidemia  Remains on high dose of atorvastatin 80 mg a day  Patient admits that he is not always perfect with his diet and we stressed again the importance of watching his intake of fats and cholesterol with his diet

## 2022-10-26 ENCOUNTER — TELEPHONE (OUTPATIENT)
Dept: INTERNAL MEDICINE CLINIC | Facility: CLINIC | Age: 77
End: 2022-10-26

## 2022-10-26 NOTE — TELEPHONE ENCOUNTER
Upon review of the In Basket request we were able to locate, review, and update the patient chart as requested for Hemoglobin A1c and Urine Microalbumin  Any additional questions or concerns should be emailed to the Practice Liaisons via the appropriate education email address, please do not reply via In Basket      Thank you  Holly Garibay

## 2022-10-26 NOTE — TELEPHONE ENCOUNTER
I did call and discuss her father's problem with orthostasis  They have already contacted cardiologist for further evaluation  We discussed medication that may be accentuating this specifically the Flomax    Needs to coordinate with Cardiology and Urology as far as whether modification treatment is necessary for further workup

## 2022-10-26 NOTE — TELEPHONE ENCOUNTER
Patients daughter called and her dad was seen yesterday and was told to call and ask for Dr Karen Amor  Please call her back at [phone]    Thank you

## 2023-04-23 LAB — PSA SERPL-MCNC: 1.2 NG/ML (ref 0–4)

## 2023-04-25 ENCOUNTER — OFFICE VISIT (OUTPATIENT)
Dept: INTERNAL MEDICINE CLINIC | Facility: CLINIC | Age: 78
End: 2023-04-25

## 2023-04-25 VITALS
DIASTOLIC BLOOD PRESSURE: 74 MMHG | WEIGHT: 214.2 LBS | HEIGHT: 72 IN | BODY MASS INDEX: 29.01 KG/M2 | TEMPERATURE: 97.3 F | OXYGEN SATURATION: 98 % | SYSTOLIC BLOOD PRESSURE: 124 MMHG | HEART RATE: 63 BPM

## 2023-04-25 DIAGNOSIS — E11.59 TYPE 2 DIABETES MELLITUS WITH OTHER CIRCULATORY COMPLICATION, WITHOUT LONG-TERM CURRENT USE OF INSULIN (HCC): Primary | ICD-10-CM

## 2023-04-25 DIAGNOSIS — I10 PRIMARY HYPERTENSION: ICD-10-CM

## 2023-04-25 DIAGNOSIS — E66.3 OVERWEIGHT: ICD-10-CM

## 2023-04-25 DIAGNOSIS — E78.01 FAMILIAL HYPERCHOLESTEROLEMIA: ICD-10-CM

## 2023-04-25 DIAGNOSIS — I25.5 ISCHEMIC CARDIOMYOPATHY: ICD-10-CM

## 2023-04-25 NOTE — ASSESSMENT & PLAN NOTE
Longstanding history of diabetes mellitus type 2  Continues to follow-up with his endocrinologist and has noted hemoglobin A1c is showing adequate control  As previously patient admits he is not always perfect with his diet  Continues with regular appointments with ophthalmology and also with podiatry  We will continue present medication and surveillance    We will continue to monitor lab tests are performed through his endocrinologist   Lab Results   Component Value Date    HGBA1C 6 5 10/18/2022

## 2023-04-25 NOTE — ASSESSMENT & PLAN NOTE
With his BMI patient is considered overweight  He has gained some weight since his last visit and admits that during the winter months he was more sedentary  His wife states he is not watching his diet nor getting any routine exercise but hopefully this will change in the near future  Knows the importance of watching his caloric intake and diet in order to help lose weight

## 2023-04-25 NOTE — ASSESSMENT & PLAN NOTE
Longstanding history of hypertension  Blood pressure is showing adequate control with present treatment  Has regular blood pressure checks with his specialists including endocrinology, cardiology  They also continue to monitor his renal function which has been stable

## 2023-04-25 NOTE — PROGRESS NOTES
Name: Kenzie Green      : 1945      MRN: [id number]  Encounter Provider: Rufino Pride DO  Encounter Date: 2023   Encounter department: [facility]    Assessment & Plan     1  Type 2 diabetes mellitus with other circulatory complication, without long-term current use of insulin (HCC)  Assessment & Plan:  Longstanding history of diabetes mellitus type 2  Continues to follow-up with his endocrinologist and has noted hemoglobin A1c is showing adequate control  As previously patient admits he is not always perfect with his diet  Continues with regular appointments with ophthalmology and also with podiatry  We will continue present medication and surveillance  We will continue to monitor lab tests are performed through his endocrinologist   Lab Results   Component Value Date    HGBA1C 6 5 10/18/2022         2  Primary hypertension  Assessment & Plan:  Longstanding history of hypertension  Blood pressure is showing adequate control with present treatment  Has regular blood pressure checks with his specialists including endocrinology, cardiology  They also continue to monitor his renal function which has been stable  3  Familial hypercholesterolemia  Assessment & Plan:  Patient was on a high dose of atorvastatin and also Zetia in order to control his cholesterol     As previously admits he is not always perfect with his diet  4  Ischemic cardiomyopathy  Assessment & Plan:  History coronary artery disease ischemic cardiomyopathy  Continues to be on medication to optimize his cardiac function  Continues to follow-up with his cardiologist   Has a cardiologist here in [place] also one in [place]  In [place] they did make some adjustments with his defibrillator      5  Overweight  Assessment & Plan: With his BMI patient is considered overweight  He has gained some weight since his last visit and admits that during the winter months he was more sedentary  His wife states he is not watching his diet nor getting any routine exercise but hopefully this will change in the near future  Knows the importance of watching his caloric intake and diet in order to help lose weight  Subjective     Patient is a 58-year-old male history of multiple medical problems who is here today for routine follow-up accompanied by his wife  Patient continues to follow-up with his specialists including cardiologist not only outside of [place] but also in [place], endocrinologist, podiatrist, ophthalmologist   Patient states in general he is doing relatively well with no new complaints  Had some adjustment with his defibrillator when he was in [place] seeing his cardiologist there  Review of Systems   Constitutional: Negative  Mostly sedentary and hopefully will start some routine exercise program in the near future   HENT: Negative  Eyes: Negative  Respiratory: Negative  Cardiovascular: Negative  Gastrointestinal: Negative  Endocrine: Negative  Genitourinary: Negative  Musculoskeletal: Negative  Skin: Negative  Allergic/Immunologic: Negative  Neurological: Negative  Hematological: Negative  Psychiatric/Behavioral: Negative  Past Medical History:   Diagnosis Date   • Afib [facility])    • Arthritis 1987   • Chronic kidney disease 2022   • Coronary artery disease ? • [facility] ) 1990   • Diverticulitis of colon 1995   • History of colon resection    • HL (hearing loss) 2021   • Hx of coronary angioplasty    • Hypertension ? • Myocardial infarction [facility]) ? • Right arm fracture 1989   • Substance abuse ([facility] ) 1983     Past Surgical History:   Procedure Laterality Date   • CARDIAC SURGERY  ? • CARDIAC VALVE REPLACEMENT  06/2022   • COLON SURGERY  ? • CORONARY ARTERY BYPASS GRAFT  06/2022   • FRACTURE SURGERY  ? • VENTRICULOPERITONEAL SHUNT  ?      Family History   Problem Relation Age of Onset   • No Known Problems Mother    • No Known Problems Father    • No Known Problems Sister    • No Known Problems Brother    • No Known Problems Maternal Grandmother    • No Known Problems Maternal Grandfather    • No Known Problems Paternal Grandmother    • No Known Problems Paternal Grandfather      Social History     Socioeconomic History   • Marital status: /Civil Union     Spouse name: None   • Number of children: None   • Years of education: None   • Highest education level: None   Occupational History   • None   Tobacco Use   • Smoking status: Former     Packs/day: 3 00     Years: 35 00     Pack years: 105 00     Types: Cigarettes, Pipe, Cigars     Start date: 1953     Quit date: 1987     Years since quittin 4   • Smokeless tobacco: Never   Vaping Use   • Vaping Use: Never used   Substance and Sexual Activity   • Alcohol use: Not Currently     Comment: Not since    • Drug use: Never   • Sexual activity: Yes     Partners: Female     Birth control/protection: None   Other Topics Concern   • None   Social History Narrative   • None     Social Determinants of Health     Financial Resource Strain: Low Risk    • Difficulty of Paying Living Expenses: Not very hard   Food Insecurity: Not on file   Transportation Needs: No Transportation Needs   • Lack of Transportation (Medical): No   • Lack of Transportation (Non-Medical):  No   Physical Activity: Not on file   Stress: Not on file   Social Connections: Not on file   Intimate Partner Violence: Not on file   Housing Stability: Not on file     Current Outpatient Medications on File Prior to Visit   Medication Sig   • ACCU-CHEK ARVIND PLUS test strip USE EVERY DAY  TO CHECK BLOOD SUGAR   • Accu-Chek Softclix Lancets lancets TEST BLOOD SUGAR EVERY DAY AS DIRECTED   • aspirin (ECOTRIN LOW STRENGTH) 81 mg EC tablet Take 81 mg by mouth daily   • atorvastatin (LIPITOR) 80 mg tablet    • Calcium Carb-Cholecalciferol (CALCIUM CARBONATE-VITAMIN D3 PO) Take by mouth   • DAILY MULTIPLE VITAMINS tablet Take by mouth   • Droplet Pen Needles 32G X 4 MM MISC    • ELIQUIS 5 MG 5 mg 2 (two) times a day    • ezetimibe (ZETIA) 10 mg tablet Take 10 mg by mouth daily   • metoprolol succinate (TOPROL-XL) 25 mg 24 hr tablet 50 mg   • Ozempic, 1 MG/DOSE, 4 MG/3ML SOPN injection pen    • tamsulosin (FLOMAX) 0 4 mg TK ONE C PO QHS   • acetaminophen (TYLENOL) 325 mg tablet 975 mg   • amiodarone 200 mg tablet    • cetirizine (ZyrTEC) 10 mg tablet Take 10 mg by mouth daily   • clopidogrel (PLAVIX) 75 mg tablet TK 1 T PO D   • Insulin Degludec 100 UNIT/ML SOLN Inject 20 Units under the skin   • JANUVIA 100 MG tablet TAKE 1 TABLET EVERY DAY   • Lantus SoloStar 100 units/mL SOPN INJECT 10 UNITS SUBCUTANEOUSLY THREE TIMES DAILY BEFORE MEALS  (Patient not taking: Reported on 7/20/2022)   • metFORMIN (GLUCOPHAGE-XR) 750 mg 24 hr tablet TAKE 1 TABLET TWICE DAILY   • pantoprazole (PROTONIX) 40 mg tablet      No Known Allergies  Immunization History   Administered Date(s) Administered   • COVID-19 MODERNA VACC 0 5 ML IM 01/28/2021, 01/28/2021, 02/25/2021, 02/25/2021, 10/29/2021   • H1N1, All Formulations 01/08/2010   • INFLUENZA 10/01/2021   • Influenza Split High Dose Preservative Free IM 09/03/2013, 10/03/2014, 11/16/2015, 12/02/2016, 10/31/2017, 10/31/2017   • Influenza, high dose seasonal 0 7 mL 09/20/2018, 09/12/2019, 10/19/2020   • Influenza, seasonal, injectable 1945, 09/20/2012   • Pneumococcal Conjugate 13-Valent 12/02/2016   • Pneumococcal Conjugate Vaccine 20-valent (Pcv20), Polysace 07/20/2022       Objective     /74   Pulse 63   Temp (!) 97 3 °F (36 3 °C)   Ht 6' (1 829 m)   Wt 97 2 kg (214 lb 3 2 oz)   SpO2 98%   BMI 29 05 kg/m²     Physical Exam  Vitals and nursing note reviewed  Constitutional:       General: He is not in acute distress  Appearance: Normal appearance  He is not ill-appearing, toxic-appearing or diaphoretic        Comments: Pleasant, articulate, overweight 40-year-old male who is awake alert in no acute distress oriented x3  Accompanied by his wife for the appointment today   HENT:      Head: Normocephalic  Right Ear: Tympanic membrane, ear canal and external ear normal  There is no impacted cerumen  Left Ear: Tympanic membrane, ear canal and external ear normal  There is no impacted cerumen  Nose: Nose normal  No congestion or rhinorrhea  Mouth/Throat:      Mouth: Mucous membranes are moist       Pharynx: Oropharynx is clear  No oropharyngeal exudate or posterior oropharyngeal erythema  Eyes:      General: No scleral icterus  Right eye: No discharge  Left eye: No discharge  Extraocular Movements: Extraocular movements intact  Conjunctiva/sclera: Conjunctivae normal       Pupils: Pupils are equal, round, and reactive to light  Neck:      Vascular: No carotid bruit  Cardiovascular:      Rate and Rhythm: Normal rate and regular rhythm  Heart sounds: Normal heart sounds  No murmur heard  No friction rub  No gallop  Comments: Faint pulses bilateral upper and lower extremity  Pulmonary:      Effort: Pulmonary effort is normal  No respiratory distress  Breath sounds: Normal breath sounds  No stridor  No wheezing, rhonchi or rales  Chest:      Chest wall: No tenderness  Abdominal:      General: Abdomen is flat  Bowel sounds are normal  There is no distension  Palpations: Abdomen is soft  There is no mass  Tenderness: There is no abdominal tenderness  There is no right CVA tenderness, left CVA tenderness, guarding or rebound  Hernia: No hernia is present  Genitourinary:     Comments: Patient deferred exam  Musculoskeletal:         General: Deformity (Generalized arthritic changes with nothing acute) present  No swelling, tenderness or signs of injury  Normal range of motion  Cervical back: Normal range of motion and neck supple  No rigidity or tenderness        Right lower leg: No edema  Left lower leg: No edema  Comments: Some diffuse arthritic changes as noted previously with nothing acute   Lymphadenopathy:      Cervical: No cervical adenopathy  Skin:     General: Skin is warm and dry  Coloration: Skin is not jaundiced or pale  Findings: No bruising, erythema, lesion or rash  Neurological:      General: No focal deficit present  Mental Status: He is alert and oriented to person, place, and time  Mental status is at baseline  Cranial Nerves: No cranial nerve deficit  Sensory: No sensory deficit  Motor: No weakness  Coordination: Coordination normal       Gait: Gait normal       Deep Tendon Reflexes: Reflexes normal    Psychiatric:         Mood and Affect: Mood normal          Behavior: Behavior normal          Thought Content:  Thought content normal          Judgment: Judgment normal        Sid Vu DO

## 2023-04-25 NOTE — ASSESSMENT & PLAN NOTE
History coronary artery disease ischemic cardiomyopathy  Continues to be on medication to optimize his cardiac function  Continues to follow-up with his cardiologist   Has a cardiologist here in [place] also one in [place]    In [place] they did make some adjustments with his defibrillator

## 2023-04-25 NOTE — ASSESSMENT & PLAN NOTE
Patient was on a high dose of atorvastatin and also Zetia in order to control his cholesterol     As previously admits he is not always perfect with his diet

## 2023-11-16 ENCOUNTER — OFFICE VISIT (OUTPATIENT)
Dept: INTERNAL MEDICINE CLINIC | Facility: CLINIC | Age: 78
End: 2023-11-16
Payer: COMMERCIAL

## 2023-11-16 VITALS
BODY MASS INDEX: 28.31 KG/M2 | WEIGHT: 209 LBS | HEIGHT: 72 IN | DIASTOLIC BLOOD PRESSURE: 76 MMHG | HEART RATE: 59 BPM | TEMPERATURE: 97.4 F | OXYGEN SATURATION: 96 % | SYSTOLIC BLOOD PRESSURE: 118 MMHG

## 2023-11-16 DIAGNOSIS — I10 PRIMARY HYPERTENSION: ICD-10-CM

## 2023-11-16 DIAGNOSIS — E78.01 FAMILIAL HYPERCHOLESTEROLEMIA: ICD-10-CM

## 2023-11-16 DIAGNOSIS — I48.0 PAROXYSMAL ATRIAL FIBRILLATION (HCC): ICD-10-CM

## 2023-11-16 DIAGNOSIS — E11.59 TYPE 2 DIABETES MELLITUS WITH OTHER CIRCULATORY COMPLICATION, WITHOUT LONG-TERM CURRENT USE OF INSULIN (HCC): Primary | ICD-10-CM

## 2023-11-16 DIAGNOSIS — I25.5 ISCHEMIC CARDIOMYOPATHY: ICD-10-CM

## 2023-11-16 DIAGNOSIS — Z00.00 MEDICARE ANNUAL WELLNESS VISIT, SUBSEQUENT: ICD-10-CM

## 2023-11-16 PROCEDURE — 99214 OFFICE O/P EST MOD 30 MIN: CPT | Performed by: INTERNAL MEDICINE

## 2023-11-16 PROCEDURE — G0439 PPPS, SUBSEQ VISIT: HCPCS | Performed by: INTERNAL MEDICINE

## 2023-11-16 RX ORDER — MIDODRINE HYDROCHLORIDE 5 MG/1
5 TABLET ORAL
COMMUNITY

## 2023-11-16 RX ORDER — REPAGLINIDE 1 MG/1
1 TABLET ORAL
COMMUNITY

## 2023-11-16 NOTE — ASSESSMENT & PLAN NOTE
Patient has a history of atrial fibrillation. His heart rate is controlled, bradycardia with some ectopy heard on auscultation. Patient remains on oral anticoagulants and will continue to follow-up with cardiology.

## 2023-11-16 NOTE — ASSESSMENT & PLAN NOTE
Patient is a 64-year-old male history of significant medical problems outlined previously who is here today for repeat Medicare wellness visit. Patient did have labs performed through our office and with his other specialist over the past 4 months and we did discuss the results at length with the patient. Patient states in general doing relatively well. Admits that he does not have the stamina that he did have in the past but this has been a gradual change. Continues to follow-up with his multiple specialists including endocrinology, cardiology, nephrology. Is undergoing work-up and evaluation for peripheral artery disease versus peripheral neuropathy to lower extremities. He spent most of the summer probably locally with his wife who underwent recent treatment for pancreatic cancer. Plans to spend most of the winter in [place] with his wife. He did undergo exam today in the office with no acute changes. We did review his records from his cardiologist both here and in [place] and he will be going for peripheral vascular studies to the lower extremities in the near future.   He will continue present medication and surveillance by his specialist.  Be seen again in the office in approximately 6 months

## 2023-11-16 NOTE — ASSESSMENT & PLAN NOTE
Patient does have a history of ischemic cardiomyopathy. Looking at his old records last echo shows an ejection fraction of approximately 25%. Patient has had no acute cardiac insults since last seen and again will continue follow-up with his specialists.

## 2023-11-16 NOTE — PROGRESS NOTES
Assessment and Plan:     Problem List Items Addressed This Visit    None       Preventive health issues were discussed with patient, and age appropriate screening tests were ordered as noted in patient's After Visit Summary. Personalized health advice and appropriate referrals for health education or preventive services given if needed, as noted in patient's After Visit Summary. History of Present Illness:     Patient presents for a Medicare Wellness Visit    HPI   Patient Care Team:  Dee Maravilla DO as PCP - General  Dee Maravilla DO as PCP - [facility] CHRISSY Sexton (RTE)     Review of Systems:     Review of Systems     Problem List:     Patient Active Problem List   Diagnosis    Arteriosclerotic cardiovascular disease    Atrial fibrillation ([address])    Type 2 diabetes mellitus with stage 3 chronic kidney disease, with long-term current use of insulin, unspecified whether stage 3a or 3b CKD ([address])    Hyperlipidemia    Hypertension    Acute cystitis without hematuria    Overweight    Medicare annual wellness visit, subsequent    Vascular dementia without behavioral disturbance ([address])    Type 2 diabetes mellitus with other circulatory complication, without long-term current use of insulin ([address])    Ischemic cardiomyopathy      Past Medical and Surgical History:     Past Medical History:   Diagnosis Date    Afib [facility])     Arthritis 1987    Chronic kidney disease 2022    Coronary artery disease ? Diabetes mellitus ([address]) 1990    Diverticulitis of colon 1995    History of colon resection     HL (hearing loss) 2021    Hx of coronary angioplasty     Hypertension ? Myocardial infarction [facility]) ? Right arm fracture 1989    Substance abuse ([facility]) 1983     Past Surgical History:   Procedure Laterality Date    CARDIAC SURGERY  ? CARDIAC VALVE REPLACEMENT  06/2022    COLON SURGERY  ? CORONARY ARTERY BYPASS GRAFT  06/2022    FRACTURE SURGERY  ? VENTRICULOPERITONEAL SHUNT  ?       Family History:     Family History   Problem Relation Age of Onset    No Known Problems Mother     No Known Problems Father     No Known Problems Sister     No Known Problems Brother     No Known Problems Maternal Grandmother     No Known Problems Maternal Grandfather     No Known Problems Paternal Grandmother     No Known Problems Paternal Grandfather       Social History:     Social History     Socioeconomic History    Marital status: /Civil Union     Spouse name: Not on file    Number of children: Not on file    Years of education: Not on file    Highest education level: Not on file   Occupational History    Not on file   Tobacco Use    Smoking status: Former     Packs/day: 3.00     Years: 35.00     Total pack years: 105.00     Types: Cigarettes, Pipe, Cigars     Start date: 1953     Quit date: 1987     Years since quittin.9    Smokeless tobacco: Never   Vaping Use    Vaping Use: Never used   Substance and Sexual Activity    Alcohol use: Not Currently     Comment: Not since     Drug use: Never    Sexual activity: Yes     Partners: Female     Birth control/protection: None   Other Topics Concern    Not on file   Social History Narrative    Not on file     Social Determinants of Health     Financial Resource Strain: Low Risk  (2023)    Overall Financial Resource Strain (CARDIA)     Difficulty of Paying Living Expenses: Not very hard   Food Insecurity: Not on file   Transportation Needs: No Transportation Needs (2023)    PRAPARE - Transportation     Lack of Transportation (Medical): No     Lack of Transportation (Non-Medical):  No   Physical Activity: Not on file   Stress: Not on file   Social Connections: Not on file   Intimate Partner Violence: Not on file   Housing Stability: Not on file      Medications and Allergies:     Current Outpatient Medications   Medication Sig Dispense Refill    ACCU-CHEK ARVIND PLUS test strip USE EVERY DAY  TO CHECK BLOOD SUGAR 100 each 3    Accu-Chek Softclix Lancets lancets TEST BLOOD SUGAR EVERY DAY AS DIRECTED 100 each 0    acetaminophen (TYLENOL) 325 mg tablet 975 mg      amiodarone 200 mg tablet       aspirin (ECOTRIN LOW STRENGTH) 81 mg EC tablet Take 81 mg by mouth daily      atorvastatin (LIPITOR) 80 mg tablet       Calcium Carb-Cholecalciferol (CALCIUM CARBONATE-VITAMIN D3 PO) Take by mouth      cetirizine (ZyrTEC) 10 mg tablet Take 10 mg by mouth daily      clopidogrel (PLAVIX) 75 mg tablet TK 1 T PO D  1    DAILY MULTIPLE VITAMINS tablet Take by mouth      Droplet Pen Needles 32G X 4 MM MISC       ELIQUIS 5 MG 5 mg 2 (two) times a day       ezetimibe (ZETIA) 10 mg tablet Take 10 mg by mouth daily      Insulin Degludec 100 UNIT/ML SOLN Inject 20 Units under the skin      JANUVIA 100 MG tablet TAKE 1 TABLET EVERY DAY 90 tablet 3    Lantus SoloStar 100 units/mL SOPN INJECT 10 UNITS SUBCUTANEOUSLY THREE TIMES DAILY BEFORE MEALS. (Patient not taking: Reported on 7/20/2022)      metFORMIN (GLUCOPHAGE-XR) 750 mg 24 hr tablet TAKE 1 TABLET TWICE DAILY 180 tablet 0    metoprolol succinate (TOPROL-XL) 25 mg 24 hr tablet 50 mg      Ozempic, 1 MG/DOSE, 4 MG/3ML SOPN injection pen       pantoprazole (PROTONIX) 40 mg tablet       tamsulosin (FLOMAX) 0.4 mg TK ONE C PO QHS  0     No current facility-administered medications for this visit.      No Known Allergies   Immunizations:     Immunization History   Administered Date(s) Administered    COVID-19 MODERNA VACC 0.5 ML IM 01/28/2021, 01/28/2021, 02/25/2021, 02/25/2021, 10/29/2021    H1N1, All Formulations 01/08/2010    INFLUENZA 10/01/2021    Influenza Split High Dose Preservative Free IM 09/03/2013, 10/03/2014, 11/16/2015, 12/02/2016, 10/31/2017, 10/31/2017    Influenza, high dose seasonal 0.7 mL 09/20/2018, 09/12/2019, 10/19/2020    Influenza, seasonal, injectable 1945, 09/20/2012    Pneumococcal Conjugate 13-Valent 12/02/2016    Pneumococcal Conjugate Vaccine 20-valent (Pcv20), Polysace 07/20/2022      Health Maintenance: There are no preventive care reminders to display for this patient. Topic Date Due    COVID-19 Vaccine (6 - Moderna series) 12/24/2021    Influenza Vaccine (1) 09/01/2023      Medicare Screening Tests and Risk Assessments:     Alirio Costello is here for his Subsequent Wellness visit. Health Risk Assessment:   Patient rates overall health as very good. Patient feels that their physical health rating is slightly worse. Patient is very satisfied with their life. Eyesight was rated as same. Hearing was rated as same. Patient feels that their emotional and mental health rating is same. Patients states they are never, rarely angry. Patient states they are sometimes unusually tired/fatigued. Pain experienced in the last 7 days has been some. Patient's pain rating has been 3/10. Patient states that he has experienced no weight loss or gain in last 6 months. Depression Screening:   PHQ-2 Score: 0      Fall Risk Screening: In the past year, patient has experienced: history of falling in past year      Home Safety:  Patient does not have trouble with stairs inside or outside of their home. Patient has working smoke alarms and has working carbon monoxide detector. Home safety hazards include: none. Nutrition:   Current diet is Regular. Medications:   Patient is not currently taking any over-the-counter supplements. Patient is able to manage medications. Activities of Daily Living (ADLs)/Instrumental Activities of Daily Living (IADLs):   Walk and transfer into and out of bed and chair?: Yes  Dress and groom yourself?: Yes    Bathe or shower yourself?: Yes    Feed yourself? Yes  Do your laundry/housekeeping?: Yes  Manage your money, pay your bills and track your expenses?: Yes  Make your own meals?: Yes    Do your own shopping?: Yes    Previous Hospitalizations:   Any hospitalizations or ED visits within the last 12 months?: No      Advance Care Planning:   Living will: Yes    Durable POA for healthcare:  Yes Advanced directive: Yes      PREVENTIVE SCREENINGS      Cardiovascular Screening:    General: Screening Not Indicated and History Lipid Disorder      Diabetes Screening:     General: Screening Not Indicated and History Diabetes      Prostate Cancer Screening:    General: Screening Not Indicated      Abdominal Aortic Aneurysm (AAA) Screening:    Risk factors include: tobacco use        Lung Cancer Screening:     General: Screening Not Indicated    Screening, Brief Intervention, and Referral to Treatment (SBIRT)    Screening  Typical number of drinks in a day: 0  Typical number of drinks in a week: 0  Interpretation: Low risk drinking behavior. AUDIT-C Screenin) How often did you have a drink containing alcohol in the past year? never  2) How many drinks did you have on a typical day when you were drinking in the past year? 0  3) How often did you have 6 or more drinks on one occasion in the past year? never    AUDIT-C Score: 0  Interpretation: Score 0-3 (male): Negative screen for alcohol misuse    Single Item Drug Screening:  How often have you used an illegal drug (including marijuana) or a prescription medication for non-medical reasons in the past year? never    Single Item Drug Screen Score: 0  Interpretation: Negative screen for possible drug use disorder    No results found. Physical Exam:     There were no vitals taken for this visit.     Physical Exam     Toya DO

## 2023-11-16 NOTE — PATIENT INSTRUCTIONS
Medicare Preventive Visit Patient Instructions  Thank you for completing your Welcome to Medicare Visit or Medicare Annual Wellness Visit today. Your next wellness visit will be due in one year (11/16/2024). The screening/preventive services that you may require over the next 5-10 years are detailed below. Some tests may not apply to you based off risk factors and/or age. Screening tests ordered at today's visit but not completed yet may show as past due. Also, please note that scanned in results may not display below. Preventive Screenings:  Service Recommendations Previous Testing/Comments   Colorectal Cancer Screening  Colonoscopy    Fecal Occult Blood Test (FOBT)/Fecal Immunochemical Test (FIT)  Fecal DNA/Cologuard Test  Flexible Sigmoidoscopy Age: 43-73 years old   Colonoscopy: every 10 years (May be performed more frequently if at higher risk)  OR  FOBT/FIT: every 1 year  OR  Cologuard: every 3 years  OR  Sigmoidoscopy: every 5 years  Screening may be recommended earlier than age 39 if at higher risk for colorectal cancer. Also, an individualized decision between you and your healthcare provider will decide whether screening between the ages of 77-80 would be appropriate.  Colonoscopy: 07/28/2020  FOBT/FIT: Not on file  Cologuard: Not on file  Sigmoidoscopy: Not on file          Prostate Cancer Screening Individualized decision between patient and health care provider in men between ages of 53-66   Medicare will cover every 12 months beginning on the day after your 50th birthday PSA: 1.2 ng/mL     Screening Not Indicated     Hepatitis C Screening Once for adults born between 1945 and 1965  More frequently in patients at high risk for Hepatitis C Hep C Antibody: Not on file        Diabetes Screening 1-2 times per year if you're at risk for diabetes or have pre-diabetes Fasting glucose: No results in last 5 years (No results in last 5 years)  A1C: 6.5 (10/18/2022)  Screening Not Indicated  History Diabetes Cholesterol Screening Once every 5 years if you don't have a lipid disorder. May order more often based on risk factors. Lipid panel: 09/05/2019  Screening Not Indicated  History Lipid Disorder      Other Preventive Screenings Covered by Medicare:  Abdominal Aortic Aneurysm (AAA) Screening: covered once if your at risk. You're considered to be at risk if you have a family history of AAA or a male between the age of 70-76 who smoking at least 100 cigarettes in your lifetime. Lung Cancer Screening: covers low dose CT scan once per year if you meet all of the following conditions: (1) Age 48-67; (2) No signs or symptoms of lung cancer; (3) Current smoker or have quit smoking within the last 15 years; (4) You have a tobacco smoking history of at least 20 pack years (packs per day x number of years you smoked); (5) You get a written order from a healthcare provider. Glaucoma Screening: covered annually if you're considered high risk: (1) You have diabetes OR (2) Family history of glaucoma OR (3)  aged 48 and older OR (3)  American aged 72 and older  Osteoporosis Screening: covered every 2 years if you meet one of the following conditions: (1) Have a vertebral abnormality; (2) On glucocorticoid therapy for more than 3 months; (3) Have primary hyperparathyroidism; (4) On osteoporosis medications and need to assess response to drug therapy. HIV Screening: covered annually if you're between the age of 14-79. Also covered annually if you are younger than 13 and older than 72 with risk factors for HIV infection. For pregnant patients, it is covered up to 3 times per pregnancy.     Immunizations:  Immunization Recommendations   Influenza Vaccine Annual influenza vaccination during flu season is recommended for all persons aged >= 6 months who do not have contraindications   Pneumococcal Vaccine   * Pneumococcal conjugate vaccine = PCV13 (Prevnar 13), PCV15 (Vaxneuvance), PCV20 (Prevnar 20)  * Pneumococcal polysaccharide vaccine = PPSV23 (Pneumovax) Adults 36-36 yo with certain risk factors or if 69+ yo  If never received any pneumonia vaccine: recommend Prevnar 20 (PCV20)  Give PCV20 if previously received 1 dose of PCV13 or PPSV23   Hepatitis B Vaccine 3 dose series if at intermediate or high risk (ex: diabetes, end stage renal disease, liver disease)   Respiratory syncytial virus (RSV) Vaccine - COVERED BY MEDICARE PART D  * RSVPreF3 (Arexvy) CDC recommends that adults 61years of age and older may receive a single dose of RSV vaccine using shared clinical decision-making (SCDM)   Tetanus (Td) Vaccine - COST NOT COVERED BY MEDICARE PART B Following completion of primary series, a booster dose should be given every 10 years to maintain immunity against tetanus. Td may also be given as tetanus wound prophylaxis. Tdap Vaccine - COST NOT COVERED BY MEDICARE PART B Recommended at least once for all adults. For pregnant patients, recommended with each pregnancy. Shingles Vaccine (Shingrix) - COST NOT COVERED BY MEDICARE PART B  2 shot series recommended in those 19 years and older who have or will have weakened immune systems or those 50 years and older     Health Maintenance Due:  There are no preventive care reminders to display for this patient. Immunizations Due:      Topic Date Due   • COVID-19 Vaccine (6 - Moderna series) 12/24/2021   • Influenza Vaccine (1) 09/01/2023     Advance Directives   What are advance directives? Advance directives are legal documents that state your wishes and plans for medical care. These plans are made ahead of time in case you lose your ability to make decisions for yourself. Advance directives can apply to any medical decision, such as the treatments you want, and if you want to donate organs. What are the types of advance directives? There are many types of advance directives, and each state has rules about how to use them.  You may choose a combination of any of the following:  Living will: This is a written record of the treatment you want. You can also choose which treatments you do not want, which to limit, and which to stop at a certain time. This includes surgery, medicine, IV fluid, and tube feedings. Durable power of  for healthcare [facility]): This is a written record that states who you want to make healthcare choices for you when you are unable to make them for yourself. This person, called a proxy, is usually a family member or a friend. You may choose more than 1 proxy. Do not resuscitate (DNR) order:  A DNR order is used in case your heart stops beating or you stop breathing. It is a request not to have certain forms of treatment, such as CPR. A DNR order may be included in other types of advance directives. Medical directive: This covers the care that you want if you are in a coma, near death, or unable to make decisions for yourself. You can list the treatments you want for each condition. Treatment may include pain medicine, surgery, blood transfusions, dialysis, IV or tube feedings, and a ventilator (breathing machine). Values history: This document has questions about your views, beliefs, and how you feel and think about life. This information can help others choose the care that you would choose. Why are advance directives important? An advance directive helps you control your care. Although spoken wishes may be used, it is better to have your wishes written down. Spoken wishes can be misunderstood, or not followed. Treatments may be given even if you do not want them. An advance directive may make it easier for your family to make difficult choices about your care. Fall Prevention    Fall prevention  includes ways to make your home and other areas safer. It also includes ways you can move more carefully to prevent a fall.  Health conditions that cause changes in your blood pressure, vision, or muscle strength and coordination may increase your risk for falls. Medicines may also increase your risk for falls if they make you dizzy, weak, or sleepy. Fall prevention tips:   Stand or sit up slowly. Use assistive devices as directed. Wear shoes that fit well and have soles that . Wear a personal alarm. Stay active. Manage your medical conditions. Home Safety Tips:  Add items to prevent falls in the bathroom. Keep paths clear. Install bright lights in your home. Keep items you use often on shelves within reach. Paint or place reflective tape on the edges of your stairs. Weight Management   Why it is important to manage your weight:  Being overweight increases your risk of health conditions such as heart disease, high blood pressure, type 2 diabetes, and certain types of cancer. It can also increase your risk for osteoarthritis, sleep apnea, and other respiratory problems. Aim for a slow, steady weight loss. Even a small amount of weight loss can lower your risk of health problems. How to lose weight safely:  A safe and healthy way to lose weight is to eat fewer calories and get regular exercise. You can lose up about 1 pound a week by decreasing the number of calories you eat by 500 calories each day. Healthy meal plan for weight management:  A healthy meal plan includes a variety of foods, contains fewer calories, and helps you stay healthy. A healthy meal plan includes the following:  Eat whole-grain foods more often. A healthy meal plan should contain fiber. Fiber is the part of grains, fruits, and vegetables that is not broken down by your body. Whole-grain foods are healthy and provide extra fiber in your diet. Some examples of whole-grain foods are whole-wheat breads and pastas, oatmeal, brown rice, and bulgur. Eat a variety of vegetables every day. Include dark, leafy greens such as spinach, kale, geraldine greens, and mustard greens.  Eat yellow and orange vegetables such as carrots, sweet potatoes, and winter squash. Eat a variety of fruits every day. Choose fresh or canned fruit (canned in its own juice or light syrup) instead of juice. Fruit juice has very little or no fiber. Eat low-fat dairy foods. Drink fat-free (skim) milk or 1% milk. Eat fat-free yogurt and low-fat cottage cheese. Try low-fat cheeses such as mozzarella and other reduced-fat cheeses. Choose meat and other protein foods that are low in fat. Choose beans or other legumes such as split peas or lentils. Choose fish, skinless poultry (chicken or turkey), or lean cuts of red meat (beef or pork). Before you cook meat or poultry, cut off any visible fat. Use less fat and oil. Try baking foods instead of frying them. Add less fat, such as margarine, sour cream, regular salad dressing and mayonnaise to foods. Eat fewer high-fat foods. Some examples of high-fat foods include french fries, doughnuts, ice cream, and cakes. Eat fewer sweets. Limit foods and drinks that are high in sugar. This includes candy, cookies, regular soda, and sweetened drinks. Exercise:  Exercise at least 30 minutes per day on most days of the week. Some examples of exercise include walking, biking, dancing, and swimming. You can also fit in more physical activity by taking the stairs instead of the elevator or parking farther away from stores. Ask your healthcare provider about the best exercise plan for you. © Copyright [employer] 2018 Information is for End User's use only and may not be sold, redistributed or otherwise used for commercial purposes.  All illustrations and images included in CareNotes® are the copyrighted property of [employer]. or  [address]

## 2023-11-16 NOTE — ASSESSMENT & PLAN NOTE
Patient had previously been placed on Ozempic by his endocrinologist.  Because of the cost this was stopped and recent studies that shows an elevation in his hemoglobin A1c. Has a follow-up visit to be seen by endocrinologist and decisions made as to whether to restart the Ozempic. Patient states he did benefit from some weight loss.   Is up-to-date with eye exam and foot exam  Lab Results   Component Value Date    HGBA1C 6.5 10/18/2022

## 2023-11-16 NOTE — PROGRESS NOTES
Name: Marjorie Reina      : 1945      MRN: [id number]  Encounter Provider: Lilliam Bates DO  Encounter Date: 2023   Encounter department: [facility]    Assessment & Plan     1. Type 2 diabetes mellitus with other circulatory complication, without long-term current use of insulin ([address])  Assessment & Plan:  Patient had previously been placed on Ozempic by his endocrinologist.  Because of the cost this was stopped and recent studies that shows an elevation in his hemoglobin A1c. Has a follow-up visit to be seen by endocrinologist and decisions made as to whether to restart the Ozempic. Patient states he did benefit from some weight loss. Is up-to-date with eye exam and foot exam  Lab Results   Component Value Date    HGBA1C 6.5 10/18/2022         2. Medicare annual wellness visit, subsequent  Assessment & Plan:  Patient is a 78-year-old male history of significant medical problems outlined previously who is here today for repeat Medicare wellness visit. Patient did have labs performed through our office and with his other specialist over the past 4 months and we did discuss the results at length with the patient. Patient states in general doing relatively well. Admits that he does not have the stamina that he did have in the past but this has been a gradual change. Continues to follow-up with his multiple specialists including endocrinology, cardiology, nephrology. Is undergoing work-up and evaluation for peripheral artery disease versus peripheral neuropathy to lower extremities. He spent most of the summer probably locally with his wife who underwent recent treatment for pancreatic cancer. Plans to spend most of the winter in [place] with his wife. He did undergo exam today in the office with no acute changes.   We did review his records from his cardiologist both here and in [place] and he will be going for peripheral vascular studies to the lower extremities in the near future. He will continue present medication and surveillance by his specialist.  Be seen again in the office in approximately 6 months      3. Paroxysmal atrial fibrillation [facility])  Assessment & Plan:  Patient has a history of atrial fibrillation. His heart rate is controlled, bradycardia with some ectopy heard on auscultation. Patient remains on oral anticoagulants and will continue to follow-up with cardiology. 4. Ischemic cardiomyopathy  Assessment & Plan:  Patient does have a history of ischemic cardiomyopathy. Looking at his old records last echo shows an ejection fraction of approximately 25%. Patient has had no acute cardiac insults since last seen and again will continue follow-up with his specialists. 5. Primary hypertension  Assessment & Plan:  Blood pressures controlled. Patient is compliant with medication. Continue also to monitor renal function      6. Familial hypercholesterolemia  Assessment & Plan:  History of hyperlipidemia, coronary artery disease. Remains on a high dose of atorvastatin 80 mg daily. He also is taking Zetia 10 mg daily in addition. Depression Screening and Follow-up Plan: Patient was screened for depression during today's encounter. They screened negative with a PHQ-2 score of 0. Subjective     Patient is an 25-year-old male history of multiple medical problems outlined previously who is here today accompanied by his wife for follow-up. Patient states that he is doing relatively well other than problems with his peripheral neuropathy which seems to be progressing. He continues to follow-up with his specialists in [place] in [place] including cardiology nephrology and endocrinology. He did have labs performed prior to the visit and we did discuss results at length with the patient showing no acute abnormalities.   States that he is considering moving down to [place] permanently with his wife      Review of Systems   Constitutional: Negative. HENT: Negative. Eyes: Negative. Respiratory: Negative. Cardiovascular: Negative. Gastrointestinal: Negative. Endocrine: Negative. Genitourinary: Negative. Musculoskeletal: Negative. Skin: Negative. Allergic/Immunologic: Negative. Neurological: Negative. Stocking distribution of peripheral neuropathy which she states is progressing   Hematological: Negative. Psychiatric/Behavioral: Negative. Past Medical History:   Diagnosis Date    Afib [facility])     Arthritis     Chronic kidney disease     Coronary artery disease ? Diabetes mellitus ([address] [place] [address])     Diverticulitis of colon     History of colon resection     HL (hearing loss)     Hx of coronary angioplasty     Hypertension ? Myocardial infarction [facility]) ? Right arm fracture     Substance abuse ([address] [place] [address])      Past Surgical History:   Procedure Laterality Date    CARDIAC SURGERY  ? CARDIAC VALVE REPLACEMENT  2022    COLON SURGERY  ? CORONARY ARTERY BYPASS GRAFT  2022    FRACTURE SURGERY  ? VENTRICULOPERITONEAL SHUNT  ?      Family History   Problem Relation Age of Onset    No Known Problems Mother     No Known Problems Father     No Known Problems Sister     No Known Problems Brother     No Known Problems Maternal Grandmother     No Known Problems Maternal Grandfather     No Known Problems Paternal Grandmother     No Known Problems Paternal Grandfather      Social History     Socioeconomic History    Marital status: /Civil Union     Spouse name: None    Number of children: None    Years of education: None    Highest education level: None   Occupational History    None   Tobacco Use    Smoking status: Former     Packs/day: 3.00     Years: 35.00     Total pack years: 105.00     Types: Cigarettes, Pipe, Cigars     Start date: 1953     Quit date: 1987     Years since quittin.9    Smokeless tobacco: Never   Vaping Use    Vaping Use: Never used Substance and Sexual Activity    Alcohol use: Not Currently     Comment: Not since 1983    Drug use: Never    Sexual activity: Yes     Partners: Female     Birth control/protection: None   Other Topics Concern    None   Social History Narrative    None     Social Determinants of Health     Financial Resource Strain: Low Risk  (11/16/2023)    Overall Financial Resource Strain (CARDIA)     Difficulty of Paying Living Expenses: Not very hard   Food Insecurity: Not on file   Transportation Needs: No Transportation Needs (11/16/2023)    PRAPARE - Transportation     Lack of Transportation (Medical): No     Lack of Transportation (Non-Medical):  No   Physical Activity: Not on file   Stress: Not on file   Social Connections: Not on file   Intimate Partner Violence: Not on file   Housing Stability: Not on file     Current Outpatient Medications on File Prior to Visit   Medication Sig    ACCU-CHEK ARVIND PLUS test strip USE EVERY DAY  TO CHECK BLOOD SUGAR    Accu-Chek Softclix Lancets lancets TEST BLOOD SUGAR EVERY DAY AS DIRECTED    aspirin (ECOTRIN LOW STRENGTH) 81 mg EC tablet Take 81 mg by mouth daily    atorvastatin (LIPITOR) 80 mg tablet     Calcium Carb-Cholecalciferol (CALCIUM CARBONATE-VITAMIN D3 PO) Take by mouth    DAILY MULTIPLE VITAMINS tablet Take by mouth    Droplet Pen Needles 32G X 4 MM MISC     ELIQUIS 5 MG 5 mg 2 (two) times a day     Empagliflozin (Jardiance) 25 MG TABS Take 25 mg by mouth 2 (two) times a day 1/2 pill 2X daily    ezetimibe (ZETIA) 10 mg tablet Take 10 mg by mouth daily    metoprolol succinate (TOPROL-XL) 25 mg 24 hr tablet 50 mg    midodrine (PROAMATINE) 5 mg tablet Take 5 mg by mouth 3 (three) times a day before meals    repaglinide (PRANDIN) 1 mg tablet Take 1 mg by mouth 3 (three) times a day before meals 1 1/2 pills before meals    acetaminophen (TYLENOL) 325 mg tablet 975 mg    amiodarone 200 mg tablet     cetirizine (ZyrTEC) 10 mg tablet Take 10 mg by mouth daily    clopidogrel (PLAVIX) 75 mg tablet TK 1 T PO D    Insulin Degludec 100 UNIT/ML SOLN Inject 20 Units under the skin    JANUVIA 100 MG tablet TAKE 1 TABLET EVERY DAY    Lantus SoloStar 100 units/mL SOPN INJECT 10 UNITS SUBCUTANEOUSLY THREE TIMES DAILY BEFORE MEALS. (Patient not taking: Reported on 7/20/2022)    metFORMIN (GLUCOPHAGE-XR) 750 mg 24 hr tablet TAKE 1 TABLET TWICE DAILY    Ozempic, 1 MG/DOSE, 4 MG/3ML SOPN injection pen     pantoprazole (PROTONIX) 40 mg tablet     tamsulosin (FLOMAX) 0.4 mg TK ONE C PO QHS     No Known Allergies  Immunization History   Administered Date(s) Administered    COVID-19 MODERNA VACC 0.5 ML IM 01/28/2021, 01/28/2021, 02/25/2021, 02/25/2021, 10/29/2021    H1N1, All Formulations 01/08/2010    INFLUENZA 10/01/2021    Influenza Split High Dose Preservative Free IM 09/03/2013, 10/03/2014, 11/16/2015, 12/02/2016, 10/31/2017, 10/31/2017    Influenza, high dose seasonal 0.7 mL 09/20/2018, 09/12/2019, 10/19/2020    Influenza, seasonal, injectable 1945, 09/20/2012    Pneumococcal Conjugate 13-Valent 12/02/2016    Pneumococcal Conjugate Vaccine 20-valent (Pcv20), Polysace 07/20/2022       Objective     /76 (BP Location: Left arm, Patient Position: Sitting, Cuff Size: Standard)   Pulse 59   Temp (!) 97.4 °F (36.3 °C)   Ht 6' (1.829 m)   Wt 94.8 kg (209 lb)   SpO2 96%   BMI 28.35 kg/m²     Physical Exam  Vitals and nursing note reviewed. Constitutional:       General: He is not in acute distress. Appearance: Normal appearance. He is not ill-appearing, toxic-appearing or diaphoretic. Comments: Pleasant articulate 24-year-old male who is awake alert in no acute distress oriented x3   HENT:      Head: Normocephalic. Right Ear: Tympanic membrane, ear canal and external ear normal. There is no impacted cerumen. Left Ear: Tympanic membrane, ear canal and external ear normal. There is no impacted cerumen. Nose: Nose normal. No congestion or rhinorrhea. Mouth/Throat:      Mouth: Mucous membranes are moist.      Pharynx: Oropharynx is clear. No oropharyngeal exudate or posterior oropharyngeal erythema. Eyes:      General: No scleral icterus. Right eye: No discharge. Left eye: No discharge. Extraocular Movements: Extraocular movements intact. Conjunctiva/sclera: Conjunctivae normal.      Pupils: Pupils are equal, round, and reactive to light. Neck:      Vascular: No carotid bruit. Cardiovascular:      Rate and Rhythm: Normal rate and regular rhythm. Heart sounds: Normal heart sounds. No murmur heard. No friction rub. No gallop. Comments: Faint pulses bilateral upper and lower extremity  Pulmonary:      Effort: Pulmonary effort is normal. No respiratory distress. Breath sounds: Normal breath sounds. No stridor. No wheezing, rhonchi or rales. Chest:      Chest wall: No tenderness. Abdominal:      General: Abdomen is flat. Bowel sounds are normal. There is no distension. Palpations: Abdomen is soft. There is no mass. Tenderness: There is no abdominal tenderness. There is no right CVA tenderness, left CVA tenderness, guarding or rebound. Hernia: No hernia is present. Genitourinary:     Comments: Patient deferred exam  Musculoskeletal:         General: Deformity (Generalized arthritic changes with nothing acute) present. No swelling, tenderness or signs of injury. Normal range of motion. Cervical back: Normal range of motion and neck supple. No rigidity or tenderness. Right lower leg: No edema. Left lower leg: No edema. Comments: Some diffuse arthritic changes as noted previously with nothing acute   Lymphadenopathy:      Cervical: No cervical adenopathy. Skin:     General: Skin is warm and dry. Coloration: Skin is not jaundiced or pale. Findings: No bruising, erythema, lesion or rash. Neurological:      General: No focal deficit present.       Mental Status: He is alert and oriented to person, place, and time. Mental status is at baseline. Cranial Nerves: No cranial nerve deficit. Sensory: Sensory deficit present. Motor: No weakness. Coordination: Coordination normal.      Gait: Gait normal.      Deep Tendon Reflexes: Reflexes normal.      Comments: Is complaining of progression of neuropathy bilateral lower extremities. Paresthesia now up to the knees bilaterally. Psychiatric:         Mood and Affect: Mood normal.         Behavior: Behavior normal.         Thought Content: Thought content normal.         Judgment: Judgment normal.       Wm Vázquez,     Answers submitted by the patient for this visit:  Medicare Annual Wellness Visit (Submitted on 11/9/2023)  How would you rate your overall health?: good  Compared to last year, how is your physical health?: slightly worse  In general, how satisfied are you with your life?: very satisfied  Compared to last year, how is your eyesight?: same  Compared to last year, how is your hearing?: same  Compared to last year, how is your emotional/mental health?: same  How often is anger a problem for you?: never, rarely  How often do you feel unusually tired/fatigued?: sometimes  In the past 7 days, how much pain have you experienced?: some  If you answered "some" or "a lot", please rate the severity of your pain on a scale of 1 to 10 (1 being the least severe pain and 10 being the most intense pain). : 3/10  In the past 6 months, have you lost or gained 10 pounds without trying?: No  One or more falls in the last year: Yes  Do you have trouble with the stairs inside or outside your home?: No  Does your home have working smoke alarms?: Yes  Does your home have a carbon monoxide monitor?: Yes  Which safety hazards (if any) have you experienced in your home? Please select all that apply.: none  How would you describe your current diet?  Please select all that apply.: Regular  In addition to prescription medications, are you taking any over-the-counter supplements?: No  Can you manage your medications?: Yes  Are you currently taking any opioid medications?: No  Can you walk and transfer into and out of your bed and chair?: Yes  Can you dress and groom yourself?: Yes  Can you bathe or shower yourself?: Yes  Can you feed yourself?: Yes  Can you do your laundry/ housekeeping?: Yes  Can you manage your money, pay your bills, and track your expenses?: Yes  Can you make your own meals?: Yes  Can you do your own shopping?: Yes  Within the last 12 months, have you had any hospitalizations or Emergency Department visits?: No  Do you have a living will?: Yes  Do you have a Durable POA (Power of ) for healthcare decisions?: Yes  Do you have an Advanced Directive for end of life decisions?: Yes  How often have you used an illegal drug (including marijuana) or a prescription medication for non-medical reasons in the past year?: never  What is the typical number of drinks you consume in a day?: 0  What is the typical number of drinks you consume in a week?: 0  How often did you have a drink containing alcohol in the past year?: never  How many drinks did you have on a typical day  when you were drinking in the past year?: 0  How often did you have 6 or more drinks on one occasion in the past year?: never

## 2023-11-16 NOTE — ASSESSMENT & PLAN NOTE
Blood pressures controlled. Patient is compliant with medication.   Continue also to monitor renal function

## 2023-11-16 NOTE — ASSESSMENT & PLAN NOTE
History of hyperlipidemia, coronary artery disease. Remains on a high dose of atorvastatin 80 mg daily. He also is taking Zetia 10 mg daily in addition.

## 2023-12-20 ENCOUNTER — TELEPHONE (OUTPATIENT)
Dept: INTERNAL MEDICINE CLINIC | Facility: CLINIC | Age: 78
End: 2023-12-20

## 2023-12-20 NOTE — TELEPHONE ENCOUNTER
Krystin called back and the covid test is negative but his lips are still swollen and he has a little trouble swallowing.   Temp is 100.9

## 2023-12-20 NOTE — TELEPHONE ENCOUNTER
Krystin called back and said that mick's symprotms are runny nose, coughing, shivering / feels cold, had a 102 temp yesterday.  His arthritis is acting up and now has a rash all over from the tylenol (she believes).   Hasn't covid tested yet but will test and call me back

## 2023-12-20 NOTE — TELEPHONE ENCOUNTER
Pt wife call asking for a Phone call from you ,Pt is not doing well today and she will like you opinion .

## 2023-12-29 ENCOUNTER — OFFICE VISIT (OUTPATIENT)
Dept: INTERNAL MEDICINE CLINIC | Facility: CLINIC | Age: 78
End: 2023-12-29
Payer: COMMERCIAL

## 2023-12-29 VITALS
HEART RATE: 78 BPM | DIASTOLIC BLOOD PRESSURE: 68 MMHG | SYSTOLIC BLOOD PRESSURE: 112 MMHG | BODY MASS INDEX: 27.09 KG/M2 | TEMPERATURE: 97.2 F | HEIGHT: 72 IN | WEIGHT: 200 LBS | OXYGEN SATURATION: 97 %

## 2023-12-29 DIAGNOSIS — Z79.4 TYPE 2 DIABETES MELLITUS WITH STAGE 3 CHRONIC KIDNEY DISEASE, WITH LONG-TERM CURRENT USE OF INSULIN, UNSPECIFIED WHETHER STAGE 3A OR 3B CKD (HCC): Primary | ICD-10-CM

## 2023-12-29 DIAGNOSIS — N18.30 TYPE 2 DIABETES MELLITUS WITH STAGE 3 CHRONIC KIDNEY DISEASE, WITH LONG-TERM CURRENT USE OF INSULIN, UNSPECIFIED WHETHER STAGE 3A OR 3B CKD (HCC): Primary | ICD-10-CM

## 2023-12-29 DIAGNOSIS — J10.1 INFLUENZA A: ICD-10-CM

## 2023-12-29 DIAGNOSIS — E11.22 TYPE 2 DIABETES MELLITUS WITH STAGE 3 CHRONIC KIDNEY DISEASE, WITH LONG-TERM CURRENT USE OF INSULIN, UNSPECIFIED WHETHER STAGE 3A OR 3B CKD (HCC): Primary | ICD-10-CM

## 2023-12-29 DIAGNOSIS — I10 PRIMARY HYPERTENSION: ICD-10-CM

## 2023-12-29 PROCEDURE — 99214 OFFICE O/P EST MOD 30 MIN: CPT | Performed by: INTERNAL MEDICINE

## 2023-12-29 NOTE — PROGRESS NOTES
Name: Basim Braga      : 1945      MRN: [id number]  Encounter Provider: Kurt Pulliam DO  Encounter Date: 2023   Encounter department: [facility] INTERNAL MEDICINE    Assessment & Plan     1. Type 2 diabetes mellitus with stage 3 chronic kidney disease, with long-term current use of insulin, unspecified whether stage 3a or 3b CKD (HCC)  Assessment & Plan:  Patient is followed extremely closely by his endocrinologist.  As noted his last blood sugar reading show hemoglobin A1c is 6.5.  Patient will continue present medication and surveillance.  Is up-to-date with eye exam and foot exam  Lab Results   Component Value Date    HGBA1C 6.5 10/18/2022         2. Influenza A  Assessment & Plan:  Patient was recently hospitalized outside for off here for influenza a.  Patient did have severe and diffuse muscle aches and pains and lethargy.  Needed transport to the hospital by ambulance.  Patient was given Tamiflu is an antiviral and states he has improved.  Did develop a diffuse rash which now is receding.  His biggest complaint at this point in time is to discrimination to the oral mucosa and a sore mouth.  Patient denies any difficulty with swallowing.  On evaluation patient again has a clearing of this petechial rash throughout his body torso both arms and legs.  Patient does have moist oral mucosa with no inflammatory lesions seen but again complaining of some discomfort in his mouth and tongue.  No plaques or exudate.  Patient was told this most likely is at discrimination from his influenza and at this point treatment would be to allow these areas to heal.  Suggested warm salt water rinses and trial of medications specifically Gly-Oxide which may help with the healing process.  If not improving or worsening symptoms to please call the office.  We do not have any evidence of a fungal infection which would be a concern especially with a history of diabetes.      3. Primary  hypertension  Assessment & Plan:  His blood pressure is followed closely by his specialist including his endocrinologist and cardiologist.  Blood pressure is showing excellent control.  They continue also to monitor his renal function.             Subjective     Patient is a 78-year-old male history of multiple medical problems outlined previously who is here today accompanied by his wife for evaluation.  Recently was hospitalized with diffuse myalgias and fatigue.  Workup and evaluation showed the patient to have positive influenza A.  During hospitalization placed on Tamiflu is an antiviral and is slowly improved.  Another symptom of his influenza illness is diffuse petechial rash throughout his body which now is resolving.  Is complaining of problems with his mouth and soreness to the mouth feels like the surface of his mucosa is irritated.  Review of Systems   Constitutional:  Positive for activity change. Negative for appetite change, chills, diaphoresis, fatigue, fever and unexpected weight change.        After acute illness slowly getting back to his normal activity level   HENT:  Positive for mouth sores. Negative for congestion, dental problem, drooling, ear discharge, ear pain, facial swelling, hearing loss, nosebleeds, postnasal drip, rhinorrhea, sinus pressure, sinus pain, sneezing, sore throat, tinnitus, trouble swallowing and voice change.    Eyes: Negative.    Respiratory: Negative.     Cardiovascular: Negative.    Gastrointestinal: Negative.    Endocrine: Negative.    Genitourinary: Negative.    Musculoskeletal: Negative.    Skin: Negative.    Allergic/Immunologic: Negative.    Neurological: Negative.    Hematological: Negative.    Psychiatric/Behavioral: Negative.       Past Medical History:   Diagnosis Date   • Afib (HCC)    • Arthritis 1987   • Chronic kidney disease 2022   • Coronary artery disease ?   • Diabetes mellitus (HCC) 1990   • Diverticulitis of colon 1995   • History of colon resection     • HL (hearing loss)    • Hx of coronary angioplasty    • Hypertension ?   • Myocardial infarction (HCC) ?   • Right arm fracture    • Substance abuse (HCC)      Past Surgical History:   Procedure Laterality Date   • CARDIAC SURGERY  ?   • CARDIAC VALVE REPLACEMENT  2022   • COLON SURGERY  ?   • CORONARY ARTERY BYPASS GRAFT  2022   • FRACTURE SURGERY  ?   • VENTRICULOPERITONEAL SHUNT  ?     Family History   Problem Relation Age of Onset   • No Known Problems Mother    • No Known Problems Father    • No Known Problems Sister    • No Known Problems Brother    • No Known Problems Maternal Grandmother    • No Known Problems Maternal Grandfather    • No Known Problems Paternal Grandmother    • No Known Problems Paternal Grandfather      Social History     Socioeconomic History   • Marital status: /Civil Union     Spouse name: None   • Number of children: None   • Years of education: None   • Highest education level: None   Occupational History   • None   Tobacco Use   • Smoking status: Former     Current packs/day: 0.00     Average packs/day: 3.0 packs/day for 35.0 years (105.0 ttl pk-yrs)     Types: Cigarettes, Pipe, Cigars     Start date: 1953     Quit date: 1987     Years since quittin.1   • Smokeless tobacco: Never   Vaping Use   • Vaping status: Never Used   Substance and Sexual Activity   • Alcohol use: Not Currently     Comment: Not since    • Drug use: Never   • Sexual activity: Yes     Partners: Female     Birth control/protection: None   Other Topics Concern   • None   Social History Narrative   • None     Social Determinants of Health     Financial Resource Strain: Not At Risk (2023)    Received from [facility]    Financial Resource Strain    • In the last 12 months did you skip medications to save money?: No    • In the last 12 months, was there a time when you needed to see a doctor but could not because of cost?: No   Food  Insecurity: Not At Risk (12/21/2023)    Received from [facility]    Food Insecurity    • In the last 12 months did you ever eat less than you felt you should because there wasn't enough money for food?: No   Transportation Needs: Not At Risk (12/21/2023)    Received from [facility]    Transporation    • In the last 12 months, have you ever had to go without healthcare because you didn't have a way to get there?: No   Physical Activity: Not on file   Stress: Not on file   Social Connections: Not At Risk (12/21/2023)    Received from [facility]    Social Connections    • Do you often feel lonely?: No   Intimate Partner Violence: Not on file   Housing Stability: Not At Risk (12/21/2023)    Received from [facility]    Housing Stability    • Are you worried that in the next 2 months you may not have stable housing?: No     Current Outpatient Medications on File Prior to Visit   Medication Sig   • ACCU-CHEK ARVIND PLUS test strip USE EVERY DAY  TO CHECK BLOOD SUGAR   • Accu-Chek Softclix Lancets lancets TEST BLOOD SUGAR EVERY DAY AS DIRECTED   • aspirin (ECOTRIN LOW STRENGTH) 81 mg EC tablet Take 81 mg by mouth daily   • atorvastatin (LIPITOR) 80 mg tablet    • Calcium Carb-Cholecalciferol (CALCIUM CARBONATE-VITAMIN D3 PO) Take by mouth   • DAILY MULTIPLE VITAMINS tablet Take by mouth   • Droplet Pen Needles 32G X 4 MM MISC    • ELIQUIS 5 MG 5 mg 2 (two) times a day    • Empagliflozin (Jardiance) 25 MG TABS Take 25 mg by mouth 2 (two) times a day 1/2 pill 2X daily   • ezetimibe (ZETIA) 10 mg tablet Take 10 mg by mouth daily   • metoprolol succinate (TOPROL-XL) 25 mg 24 hr tablet 50 mg   • midodrine (PROAMATINE) 5 mg tablet Take 5 mg by mouth 3 (three) times a day before meals   • repaglinide (PRANDIN) 1 mg tablet Take 1 mg by mouth 3 (three) times a day before meals 1 1/2 pills before meals   • acetaminophen (TYLENOL)  325 mg tablet 975 mg   • amiodarone 200 mg tablet    • cetirizine (ZyrTEC) 10 mg tablet Take 10 mg by mouth daily   • clopidogrel (PLAVIX) 75 mg tablet TK 1 T PO D   • Insulin Degludec 100 UNIT/ML SOLN Inject 20 Units under the skin   • JANUVIA 100 MG tablet TAKE 1 TABLET EVERY DAY   • Lantus SoloStar 100 units/mL SOPN INJECT 10 UNITS SUBCUTANEOUSLY THREE TIMES DAILY BEFORE MEALS. (Patient not taking: Reported on 7/20/2022)   • metFORMIN (GLUCOPHAGE-XR) 750 mg 24 hr tablet TAKE 1 TABLET TWICE DAILY   • Ozempic, 1 MG/DOSE, 4 MG/3ML SOPN injection pen    • pantoprazole (PROTONIX) 40 mg tablet    • tamsulosin (FLOMAX) 0.4 mg TK ONE C PO QHS     No Known Allergies  Immunization History   Administered Date(s) Administered   • COVID-19 MODERNA VACC 0.5 ML IM 01/28/2021, 01/28/2021, 02/25/2021, 02/25/2021, 10/29/2021   • H1N1, All Formulations 01/08/2010   • INFLUENZA 10/01/2021   • Influenza Split High Dose Preservative Free IM 09/03/2013, 10/03/2014, 11/16/2015, 12/02/2016, 10/31/2017, 10/31/2017   • Influenza, high dose seasonal 0.7 mL 09/20/2018, 09/12/2019, 10/19/2020   • Influenza, seasonal, injectable 1945, 09/20/2012   • Pneumococcal Conjugate 13-Valent 12/02/2016   • Pneumococcal Conjugate Vaccine 20-valent (Pcv20), Polysace 07/20/2022       Objective     /68 (BP Location: Left arm, Patient Position: Sitting, Cuff Size: Standard)   Pulse 78   Temp (!) 97.2 °F (36.2 °C)   Ht 6' (1.829 m)   Wt 90.7 kg (200 lb)   SpO2 97%   BMI 27.12 kg/m²     Physical Exam  Vitals and nursing note reviewed.   Constitutional:       General: He is not in acute distress.     Appearance: Normal appearance. He is not ill-appearing, toxic-appearing or diaphoretic.      Comments: Pleasant 78-year-old male who is awake alert in no acute distress oriented x 3, accompanied by his wife with the appointment   HENT:      Head: Normocephalic and atraumatic.      Right Ear: Tympanic membrane, ear canal and external ear normal.  There is no impacted cerumen.      Left Ear: Tympanic membrane, ear canal and external ear normal. There is no impacted cerumen.      Nose: Nose normal. No congestion or rhinorrhea.      Mouth/Throat:      Mouth: Mucous membranes are moist.      Pharynx: Oropharynx is clear. No oropharyngeal exudate or posterior oropharyngeal erythema.      Comments: No specific lesions could be seen to her oral mucosa.  No erythema no plaques or exudate.  Eyes:      General: No scleral icterus.        Right eye: No discharge.         Left eye: No discharge.      Extraocular Movements: Extraocular movements intact.      Conjunctiva/sclera: Conjunctivae normal.      Pupils: Pupils are equal, round, and reactive to light.   Neck:      Vascular: No carotid bruit.   Cardiovascular:      Rate and Rhythm: Normal rate and regular rhythm.      Heart sounds: Normal heart sounds.      No friction rub. No gallop.   Pulmonary:      Effort: Pulmonary effort is normal. No respiratory distress.      Breath sounds: Normal breath sounds. No stridor. No wheezing, rhonchi or rales.   Chest:      Chest wall: No tenderness.   Abdominal:      General: Bowel sounds are normal. There is no distension.      Palpations: Abdomen is soft. There is no mass.      Tenderness: There is no abdominal tenderness. There is no right CVA tenderness, left CVA tenderness, guarding or rebound.      Hernia: No hernia is present.   Musculoskeletal:      Cervical back: Normal range of motion and neck supple. No rigidity or tenderness.   Lymphadenopathy:      Cervical: No cervical adenopathy.   Skin:     General: Skin is warm and dry.      Coloration: Skin is not jaundiced or pale.      Findings: Rash present. No bruising, erythema or lesion.      Comments: Diffuse rash throughout all skin surfaces upper and lower extremities torso back petechial and patient states that his is resolving.  His wife had pictures of the rash at the worst and is showing improvement.  May be  related to his acute influenza infection   Neurological:      Mental Status: He is alert. Mental status is at baseline.   Psychiatric:         Mood and Affect: Mood normal.         Behavior: Behavior normal.         Thought Content: Thought content normal.         Judgment: Judgment normal.   Kurt Pulliam, DO

## 2023-12-29 NOTE — ASSESSMENT & PLAN NOTE
His blood pressure is followed closely by his specialist including his endocrinologist and cardiologist.  Blood pressure is showing excellent control.  They continue also to monitor his renal function.

## 2023-12-29 NOTE — ASSESSMENT & PLAN NOTE
Patient was recently hospitalized outside for off here for influenza a.  Patient did have severe and diffuse muscle aches and pains and lethargy.  Needed transport to the hospital by ambulance.  Patient was given Tamiflu is an antiviral and states he has improved.  Did develop a diffuse rash which now is receding.  His biggest complaint at this point in time is to discrimination to the oral mucosa and a sore mouth.  Patient denies any difficulty with swallowing.  On evaluation patient again has a clearing of this petechial rash throughout his body torso both arms and legs.  Patient does have moist oral mucosa with no inflammatory lesions seen but again complaining of some discomfort in his mouth and tongue.  No plaques or exudate.  Patient was told this most likely is at discrimination from his influenza and at this point treatment would be to allow these areas to heal.  Suggested warm salt water rinses and trial of medications specifically Gly-Oxide which may help with the healing process.  If not improving or worsening symptoms to please call the office.  We do not have any evidence of a fungal infection which would be a concern especially with a history of diabetes.

## 2023-12-29 NOTE — ASSESSMENT & PLAN NOTE
Patient is followed extremely closely by his endocrinologist.  As noted his last blood sugar reading show hemoglobin A1c is 6.5.  Patient will continue present medication and surveillance.  Is up-to-date with eye exam and foot exam  Lab Results   Component Value Date    HGBA1C 6.5 10/18/2022

## 2024-02-21 PROBLEM — N30.00 ACUTE CYSTITIS WITHOUT HEMATURIA: Status: RESOLVED | Noted: 2018-12-13 | Resolved: 2024-02-21

## 2024-02-21 PROBLEM — J10.1 INFLUENZA A: Status: RESOLVED | Noted: 2023-12-29 | Resolved: 2024-02-21

## 2024-02-21 PROBLEM — Z00.00 MEDICARE ANNUAL WELLNESS VISIT, SUBSEQUENT: Status: RESOLVED | Noted: 2019-09-12 | Resolved: 2024-02-21

## 2024-07-17 ENCOUNTER — OFFICE VISIT (OUTPATIENT)
Dept: INTERNAL MEDICINE CLINIC | Facility: CLINIC | Age: 79
End: 2024-07-17
Payer: COMMERCIAL

## 2024-07-17 VITALS
BODY MASS INDEX: 28.71 KG/M2 | DIASTOLIC BLOOD PRESSURE: 62 MMHG | SYSTOLIC BLOOD PRESSURE: 112 MMHG | HEIGHT: 72 IN | TEMPERATURE: 98.2 F | OXYGEN SATURATION: 98 % | WEIGHT: 212 LBS | HEART RATE: 68 BPM

## 2024-07-17 DIAGNOSIS — I10 PRIMARY HYPERTENSION: ICD-10-CM

## 2024-07-17 DIAGNOSIS — Z79.4 TYPE 2 DIABETES MELLITUS WITH STAGE 3 CHRONIC KIDNEY DISEASE, WITH LONG-TERM CURRENT USE OF INSULIN, UNSPECIFIED WHETHER STAGE 3A OR 3B CKD (HCC): ICD-10-CM

## 2024-07-17 DIAGNOSIS — N18.30 TYPE 2 DIABETES MELLITUS WITH STAGE 3 CHRONIC KIDNEY DISEASE, WITH LONG-TERM CURRENT USE OF INSULIN, UNSPECIFIED WHETHER STAGE 3A OR 3B CKD (HCC): ICD-10-CM

## 2024-07-17 DIAGNOSIS — N30.00 ACUTE CYSTITIS WITHOUT HEMATURIA: ICD-10-CM

## 2024-07-17 DIAGNOSIS — I25.10 ARTERIOSCLEROTIC CARDIOVASCULAR DISEASE: Primary | ICD-10-CM

## 2024-07-17 DIAGNOSIS — E11.22 TYPE 2 DIABETES MELLITUS WITH STAGE 3 CHRONIC KIDNEY DISEASE, WITH LONG-TERM CURRENT USE OF INSULIN, UNSPECIFIED WHETHER STAGE 3A OR 3B CKD (HCC): ICD-10-CM

## 2024-07-17 DIAGNOSIS — I48.0 PAROXYSMAL ATRIAL FIBRILLATION (HCC): ICD-10-CM

## 2024-07-17 PROCEDURE — G2211 COMPLEX E/M VISIT ADD ON: HCPCS | Performed by: INTERNAL MEDICINE

## 2024-07-17 PROCEDURE — 99214 OFFICE O/P EST MOD 30 MIN: CPT | Performed by: INTERNAL MEDICINE

## 2024-07-17 NOTE — ASSESSMENT & PLAN NOTE
Longstanding history of diabetes mellitus type 2.  Last hemoglobin A1c that we have recorded was approximately 2 years ago.  Most recent hemoglobin A1c recorded by his endocrinologist was 6.4 showing adequate control.  Again patient continues to follow-up with his endocrinologist and has had no recent changes with medication.  They also continue to monitor renal function and he undergoes diabetic foot exam with his podiatrist.  Lab Results   Component Value Date    HGBA1C 6.5 10/18/2022

## 2024-07-17 NOTE — ASSESSMENT & PLAN NOTE
Patient relates that he and his wife were recently on a cruise.  Did become acutely ill with urinary retention.  Did need to have a catheter placed for short period of time and was started on antibiotics.  He states he is feeling better overall but does have an appointment in the near future to be seen discussed this with his urologist whether further treatment is indicated.

## 2024-07-17 NOTE — ASSESSMENT & PLAN NOTE
History of hypertension.  Patient is on medication as previously including metoprolol and also has benefits from being placed on Flomax, blood pressure is controlled.  If any increase in blood pressure readings consideration would be to place the patient on an ACE inhibitor or ARB

## 2024-07-17 NOTE — PROGRESS NOTES
Ambulatory Visit  Name: Basim Braga      : 1945      MRN: [id number]  Encounter Provider: Kurt Pulliam DO  Encounter Date: 2024   Encounter department: [facility] INTERNAL MEDICINE    Assessment & Plan   1. Arteriosclerotic cardiovascular disease  Assessment & Plan:  History of coronary artery disease and has a history also of ischemic cardiomyopathy    Denies any chest pain or pressure no increasing shortness of breath with exertion.  Continues to take the diuretics as previously prescribed as well as medication in order to control his cholesterol, high dose of atorvastatin 80 mg daily.  I told the patient to make sure that we have notes from his cardiologist sent to our office  2. Type 2 diabetes mellitus with stage 3 chronic kidney disease, with long-term current use of insulin, unspecified whether stage 3a or 3b CKD (HCC)  Assessment & Plan:  Longstanding history of diabetes mellitus type 2.  Last hemoglobin A1c that we have recorded was approximately 2 years ago.  Most recent hemoglobin A1c recorded by his endocrinologist was 6.4 showing adequate control.  Again patient continues to follow-up with his endocrinologist and has had no recent changes with medication.  They also continue to monitor renal function and he undergoes diabetic foot exam with his podiatrist.  Lab Results   Component Value Date    HGBA1C 6.5 10/18/2022     3. Primary hypertension  Assessment & Plan:  History of hypertension.  Patient is on medication as previously including metoprolol and also has benefits from being placed on Flomax, blood pressure is controlled.  If any increase in blood pressure readings consideration would be to place the patient on an ACE inhibitor or ARB  4. Paroxysmal atrial fibrillation (HCC)  Assessment & Plan:  With studies apparently patient is having increased heart rate at night.  Cardiologist is suggesting the patient go for sleep study which is indicated.  Relates further that  we had sleep studies performed approximately 15 years ago which showed a mild sleep apnea.  Needs repeat studies for further evaluation and this will be arranged outside of [place] where he lives.  Keep us informed if treatment  5. Acute cystitis without hematuria  Assessment & Plan:  Patient relates that he and his wife were recently on a cruise.  Did become acutely ill with urinary retention.  Did need to have a catheter placed for short period of time and was started on antibiotics.  He states he is feeling better overall but does have an appointment in the near future to be seen discussed this with his urologist whether further treatment is indicated.         History of Present Illness     Patient is an 78-year-old male history of medical problems outlined previously who is here today for follow-up after 6-month period time.  Accompanied by his wife.  Patient states that he continues to follow-up with his cardiologist, endocrinologist, podiatrist in [place].  They continue to do routine screening testing    Relates further with studies was found to have tachycardia at nighttime and may are concerned about possible sleep apnea and he will be going for sleep studies      Review of Systems   Constitutional: Negative.         Mostly sedentary.  No changes in appetite.   HENT: Negative.     Eyes: Negative.    Respiratory: Negative.     Cardiovascular:  Positive for leg swelling. Negative for chest pain and palpitations.        Again cardiologist has noted tachycardia at nighttime.  Patient has no palpitations.  Concerns of possible sleep apnea.  Patient does have some chronic edema to lower extremities now wearing compressive stockings   Gastrointestinal: Negative.    Endocrine: Negative.    Genitourinary:  Positive for difficulty urinating. Negative for decreased urine volume, dysuria, enuresis, flank pain, frequency, genital sores, hematuria, penile discharge, penile pain, penile swelling, scrotal  swelling, testicular pain and urgency.        Again relates difficulties when on a cruise with his wife acute urinary retention and he did need placement of a Light catheter.  Has appointment for follow-up to be seen by his urologist.   Musculoskeletal: Negative.    Skin: Negative.    Allergic/Immunologic: Negative.    Neurological:  Positive for numbness. Negative for dizziness, tremors, seizures, syncope, facial asymmetry, speech difficulty, weakness, light-headedness and headaches.        Complaining of some numbness tingling in the bottom of both feet consistent with peripheral neuropathy.  Will be seen by neurologist and will have a EMG nerve conduction study performed   Hematological: Negative.    Psychiatric/Behavioral: Negative.       Past Medical History:   Diagnosis Date    Afib ([facility])     Arthritis 1987    Chronic kidney disease 2022    Coronary artery disease ?    Diabetes mellitus ([facility]) 1990    Diverticulitis of colon 1995    History of colon resection     HL (hearing loss) 2021    Hx of coronary angioplasty     Hypertension ?    Myocardial infarction ([facility]) ?    Right arm fracture 1989    Substance abuse ([facility]) 1983     Past Surgical History:   Procedure Laterality Date    CARDIAC SURGERY  ?    CARDIAC VALVE REPLACEMENT  06/2022    COLON SURGERY  ?    CORONARY ARTERY BYPASS GRAFT  06/2022    FRACTURE SURGERY  ?    VENTRICULOPERITONEAL SHUNT  ?     Family History   Problem Relation Age of Onset    No Known Problems Mother     No Known Problems Father     No Known Problems Sister     No Known Problems Brother     No Known Problems Maternal Grandmother     No Known Problems Maternal Grandfather     No Known Problems Paternal Grandmother     No Known Problems Paternal Grandfather      Social History     Tobacco Use    Smoking status: Former     Current packs/day: 0.00     Average packs/day: 3.0 packs/day for 35.0 years (105.0 ttl pk-yrs)     Types: Cigarettes, Pipe, Cigars     Start date: 6/1/1953     Quit  date: 1987     Years since quittin.6    Smokeless tobacco: Never   Vaping Use    Vaping status: Never Used   Substance and Sexual Activity    Alcohol use: Not Currently     Comment: Not since     Drug use: Never    Sexual activity: Yes     Partners: Female     Birth control/protection: None     Current Outpatient Medications on File Prior to Visit   Medication Sig    ACCU-CHEK ARVIND PLUS test strip USE EVERY DAY  TO CHECK BLOOD SUGAR    Accu-Chek Softclix Lancets lancets TEST BLOOD SUGAR EVERY DAY AS DIRECTED    aspirin (ECOTRIN LOW STRENGTH) 81 mg EC tablet Take 81 mg by mouth daily    atorvastatin (LIPITOR) 80 mg tablet     Calcium Carb-Cholecalciferol (CALCIUM CARBONATE-VITAMIN D3 PO) Take by mouth    DAILY MULTIPLE VITAMINS tablet Take by mouth    Droplet Pen Needles 32G X 4 MM MISC     ELIQUIS 5 MG 5 mg 2 (two) times a day     ezetimibe (ZETIA) 10 mg tablet Take 10 mg by mouth daily    metoprolol succinate (TOPROL-XL) 25 mg 24 hr tablet 100 mg 2 (two) times a day    Ozempic, 1 MG/DOSE, 4 MG/3ML SOPN injection pen     repaglinide (PRANDIN) 1 mg tablet Take 1 mg by mouth 3 (three) times a day before meals 1 1/2 pills before meals    acetaminophen (TYLENOL) 325 mg tablet 975 mg    amiodarone 200 mg tablet     cetirizine (ZyrTEC) 10 mg tablet Take 10 mg by mouth daily    clopidogrel (PLAVIX) 75 mg tablet TK 1 T PO D    Empagliflozin (Jardiance) 25 MG TABS Take 25 mg by mouth 2 (two) times a day 1/2 pill 2X daily    Insulin Degludec 100 UNIT/ML SOLN Inject 20 Units under the skin    JANUVIA 100 MG tablet TAKE 1 TABLET EVERY DAY    Lantus SoloStar 100 units/mL SOPN INJECT 10 UNITS SUBCUTANEOUSLY THREE TIMES DAILY BEFORE MEALS. (Patient not taking: Reported on 2022)    metFORMIN (GLUCOPHAGE-XR) 750 mg 24 hr tablet TAKE 1 TABLET TWICE DAILY    midodrine (PROAMATINE) 5 mg tablet Take 5 mg by mouth 3 (three) times a day before meals (Patient not taking: Reported on 2024)    pantoprazole  (PROTONIX) 40 mg tablet     tamsulosin (FLOMAX) 0.4 mg TK ONE C PO QHS     No Known Allergies  Immunization History   Administered Date(s) Administered    COVID-19 MODERNA VACC 0.5 ML IM 01/28/2021, 01/28/2021, 02/25/2021, 02/25/2021, 10/29/2021    COVID-19 Moderna Vac BIVALENT 12 Yr+ IM 0.5 ML 11/14/2022    H1N1, All Formulations 01/08/2010    INFLUENZA 10/01/2021    Influenza Split High Dose Preservative Free IM 09/03/2013, 10/03/2014, 11/16/2015, 12/02/2016, 10/31/2017, 10/31/2017    Influenza, high dose seasonal 0.7 mL 09/20/2018, 09/12/2019, 10/19/2020    Influenza, seasonal, injectable 1945, 09/20/2012    Pneumococcal Conjugate 13-Valent 12/02/2016    Pneumococcal Conjugate Vaccine 20-valent (Pcv20), Polysace 07/20/2022     Objective     /62   Pulse 68   Temp 98.2 °F (36.8 °C)   Ht 6' (1.829 m)   Wt 96.2 kg (212 lb)   SpO2 98%   BMI 28.75 kg/m²     Physical Exam  Vitals and nursing note reviewed.   Constitutional:       General: He is not in acute distress.     Appearance: Normal appearance. He is not ill-appearing, toxic-appearing or diaphoretic.      Comments: Pleasant, articulate 78-year-old male who is awake alert in no acute distress oriented x 3 accompanied by his wife.  Wearing support hose, compression stockings both lower extremities   HENT:      Head: Normocephalic and atraumatic.      Right Ear: Tympanic membrane, ear canal and external ear normal. There is no impacted cerumen.      Left Ear: Tympanic membrane, ear canal and external ear normal. There is no impacted cerumen.      Nose: Nose normal. No congestion or rhinorrhea.      Mouth/Throat:      Mouth: Mucous membranes are moist.      Pharynx: Oropharynx is clear. No oropharyngeal exudate or posterior oropharyngeal erythema.   Eyes:      General: No scleral icterus.        Right eye: No discharge.         Left eye: No discharge.      Extraocular Movements: Extraocular movements intact.      Conjunctiva/sclera: Conjunctivae  normal.      Pupils: Pupils are equal, round, and reactive to light.   Neck:      Vascular: No carotid bruit.   Cardiovascular:      Rate and Rhythm: Normal rate and regular rhythm.      Heart sounds:      No friction rub. No gallop.   Pulmonary:      Effort: Pulmonary effort is normal. No respiratory distress.      Breath sounds: Normal breath sounds. No stridor. No wheezing, rhonchi or rales.   Chest:      Chest wall: No tenderness.   Abdominal:      General: Abdomen is flat. Bowel sounds are normal. There is no distension.      Palpations: Abdomen is soft. There is no mass.      Tenderness: There is no abdominal tenderness. There is no right CVA tenderness, left CVA tenderness, guarding or rebound.      Hernia: No hernia is present.   Musculoskeletal:         General: Deformity present. No swelling, tenderness or signs of injury.      Cervical back: Normal range of motion and neck supple. No rigidity or tenderness.      Right lower leg: No edema.      Left lower leg: No edema.      Comments: Patient does have diffuse arthritic changes as previously.  Patient is now wearing compressive stockings bilateral lower extremities.  Denies any increased swelling of the feet and ankles with the stockings in place.   Lymphadenopathy:      Cervical: No cervical adenopathy.   Skin:     General: Skin is warm and dry.      Coloration: Skin is not jaundiced or pale.      Findings: No bruising, erythema, lesion or rash.   Neurological:      General: No focal deficit present.      Mental Status: He is alert and oriented to person, place, and time. Mental status is at baseline.      Cranial Nerves: No cranial nerve deficit.      Sensory: Sensory deficit present.      Motor: No weakness.      Coordination: Coordination normal.      Gait: Gait normal.      Deep Tendon Reflexes: Reflexes abnormal.      Comments: Is complaining of peripheral neuropathy lower extremities   Psychiatric:         Mood and Affect: Mood normal.          Behavior: Behavior normal.         Thought Content: Thought content normal.         Judgment: Judgment normal.

## 2024-07-17 NOTE — ASSESSMENT & PLAN NOTE
History of coronary artery disease and has a history also of ischemic cardiomyopathy    Denies any chest pain or pressure no increasing shortness of breath with exertion.  Continues to take the diuretics as previously prescribed as well as medication in order to control his cholesterol, high dose of atorvastatin 80 mg daily.  I told the patient to make sure that we have notes from his cardiologist sent to our office

## 2024-07-17 NOTE — ASSESSMENT & PLAN NOTE
With studies apparently patient is having increased heart rate at night.  Cardiologist is suggesting the patient go for sleep study which is indicated.  Relates further that we had sleep studies performed approximately 15 years ago which showed a mild sleep apnea.  Needs repeat studies for further evaluation and this will be arranged outside of [place] where he lives.  Keep us informed if treatment